# Patient Record
Sex: FEMALE | Race: WHITE | NOT HISPANIC OR LATINO | Employment: FULL TIME | URBAN - METROPOLITAN AREA
[De-identification: names, ages, dates, MRNs, and addresses within clinical notes are randomized per-mention and may not be internally consistent; named-entity substitution may affect disease eponyms.]

---

## 2017-02-21 LAB
HBV SURFACE AG SERPL QL IA: NEGATIVE
HIV 1+2 AB+HIV1 P24 AG SERPL QL IA: NEGATIVE
RUBELLA ANTIBODY IGG QUANTITATIVE: NORMAL IU/ML
T PALLIDUM IGG SER QL: NONREACTIVE

## 2017-02-24 ENCOUNTER — TRANSFERRED RECORDS (OUTPATIENT)
Dept: HEALTH INFORMATION MANAGEMENT | Facility: CLINIC | Age: 32
End: 2017-02-24

## 2017-02-24 ENCOUNTER — TELEPHONE (OUTPATIENT)
Dept: MATERNAL FETAL MEDICINE | Facility: CLINIC | Age: 32
End: 2017-02-24

## 2017-02-24 NOTE — TELEPHONE ENCOUNTER
Order reviewed from [facility] for MFM consult due to exposure to chicken pox.  Data reviewed with Dr. Ram who also contacted Pediatric ID to discuss case.  PCC spoke with Jie regarding details and POC.  Jie states her mother told her she and all her sisters had chicken pox as children, but she had a mild case in comparison to her siblings.  Jie's 21 month old daughter was vaccinated as recommended at 12 months of age also.  Her daughter was exposed to another child at  with chicken pox on 2/8/17.  Rash and sores appeared on daughter on 2/19.  Daughter was seen by a pediatrician on 2/20 who said it was chicken pox.  No blood work was done on the daughter.  Jie saw her OB/GYN on 2/21 who day varicella IgG (0.5) and IgM (pending).  Jie will start valtrex 500 mg BID PO x 7 days as recommended by Dr. Casiano/Dr. Ram.  Jie will be seen for a MFM consult on 3/1/17. Jie encouraged to have no contact with her daughter until all sores are scabbed over. ROD Rich notified of POC and will order Valtrex.    Genny Coley RN

## 2017-02-27 ENCOUNTER — TELEPHONE (OUTPATIENT)
Dept: MATERNAL FETAL MEDICINE | Facility: CLINIC | Age: 32
End: 2017-02-27

## 2017-02-27 NOTE — TELEPHONE ENCOUNTER
Jie called wanting to cancel MFM consult for Wednesday 3/1/17 due to exposure to chicken pox.  Jie has not developed any rash or lesions.  She is taking valtrex as prescribed.  I encouraged Jie to call [facility] to discuss the need or not for a MFM consult.  Jie verbalized understanding.    Genny Coley RN

## 2017-03-01 ENCOUNTER — TRANSFERRED RECORDS (OUTPATIENT)
Dept: HEALTH INFORMATION MANAGEMENT | Facility: CLINIC | Age: 32
End: 2017-03-01

## 2017-03-01 ENCOUNTER — DOCUMENTATION ONLY (OUTPATIENT)
Dept: MATERNAL FETAL MEDICINE | Facility: CLINIC | Age: 32
End: 2017-03-01

## 2017-03-01 ENCOUNTER — TELEPHONE (OUTPATIENT)
Dept: MATERNAL FETAL MEDICINE | Facility: CLINIC | Age: 32
End: 2017-03-01

## 2017-03-01 NOTE — TELEPHONE ENCOUNTER
PCC returned call to Jie.  Jie calling worried that something is wrong with her pregnancy because OB/GYN is sending her to [facility] for screeing and ultrasounds. Jie was scheduled for [facility] consult today but cancelled on 2/27.  Jie states she was at [facility] for 1st trimester screen today and they were unable to get measurement.  Dr. Travis referred Jie to [facility].  I assured Jie we will see her at [facility] for genetic counseling, 1st trimester screening, and [facility] consult to discuss recent exposure to chicken pox.  Jie verbalized understanding.    Genny Coley RN

## 2017-03-01 NOTE — PROGRESS NOTES
PCC sent inbasket message to Sena Campoverde on 2/27 at [facility] regarding patient cancelling MFM consult for 3/1/17.  PCC contacted [facility] and left a message for Sena Campoverde CNM regarding cancellation on 3/1/17.  Sena out of office until 3/2/17.    Genny Coley RN

## 2017-03-02 ENCOUNTER — TELEPHONE (OUTPATIENT)
Dept: MATERNAL FETAL MEDICINE | Facility: CLINIC | Age: 32
End: 2017-03-02

## 2017-03-02 NOTE — TELEPHONE ENCOUNTER
Left message for Jie regarding appointment date and times for Friday March 10, 2017 at [facility] beginning at 9:30am with genetic counseling, 1st trimester ultrasound, [facility] consult due to varicella exposure.  LEEROY notified.  Team sheet faxed to [facility].    Genny Coley RN

## 2017-03-10 ENCOUNTER — OFFICE VISIT (OUTPATIENT)
Dept: MATERNAL FETAL MEDICINE | Facility: CLINIC | Age: 32
End: 2017-03-10
Attending: MIDWIFE
Payer: COMMERCIAL

## 2017-03-10 ENCOUNTER — HOSPITAL ENCOUNTER (OUTPATIENT)
Dept: LAB | Facility: CLINIC | Age: 32
End: 2017-03-10
Attending: OBSTETRICS & GYNECOLOGY
Payer: COMMERCIAL

## 2017-03-10 ENCOUNTER — OFFICE VISIT (OUTPATIENT)
Dept: MATERNAL FETAL MEDICINE | Facility: CLINIC | Age: 32
End: 2017-03-10
Attending: OBSTETRICS & GYNECOLOGY
Payer: COMMERCIAL

## 2017-03-10 ENCOUNTER — HOSPITAL ENCOUNTER (OUTPATIENT)
Dept: ULTRASOUND IMAGING | Facility: CLINIC | Age: 32
Discharge: HOME | End: 2017-03-10
Attending: OBSTETRICS & GYNECOLOGY | Admitting: OBSTETRICS & GYNECOLOGY
Payer: COMMERCIAL

## 2017-03-10 DIAGNOSIS — O99.891: ICD-10-CM

## 2017-03-10 DIAGNOSIS — Z20.820: ICD-10-CM

## 2017-03-10 DIAGNOSIS — Z36.9 FIRST TRIMESTER SCREENING: ICD-10-CM

## 2017-03-10 DIAGNOSIS — O26.90 PREGNANCY RELATED CONDITION, UNSPECIFIED TRIMESTER: ICD-10-CM

## 2017-03-10 DIAGNOSIS — Z36.9 FIRST TRIMESTER SCREENING: Primary | ICD-10-CM

## 2017-03-10 PROCEDURE — 96040 ZZH GENETIC COUNSELING, EACH 30 MINUTES: CPT | Mod: ZF | Performed by: GENETIC COUNSELOR, MS

## 2017-03-10 PROCEDURE — 76813 OB US NUCHAL MEAS 1 GEST: CPT

## 2017-03-10 PROCEDURE — 36415 COLL VENOUS BLD VENIPUNCTURE: CPT | Performed by: OBSTETRICS & GYNECOLOGY

## 2017-03-10 PROCEDURE — 84163 PAPPA SERUM: CPT | Performed by: OBSTETRICS & GYNECOLOGY

## 2017-03-10 PROCEDURE — 84704 HCG FREE BETACHAIN TEST: CPT | Performed by: OBSTETRICS & GYNECOLOGY

## 2017-03-10 NOTE — PROGRESS NOTES
[facility]  Genetic Counseling Consult    Patient: Jie Salas YOB: 1985   Date of Service: 3/10/17      Jie Salas was seen at [facility] [place] for genetic consultation and first trimester screening.  The indication for genetic counseling is Varicella exposure and the patient s request for a first trimester screen.      Impression/Plan:     1.  Jie had an ultrasound and blood draw for first trimester screening.  Results are expected within 3-5 days and will be sent to her OB.  We will call her to discuss the results, and she requested a detailed message with results on her voicemail.    2.  Maternal serum AFP (single marker screen) is recommended after 15 weeks to screen for open neural tube defects.    3.  Jie has had Varicella exposure in this pregnancy and is non-immune.  She has not developed a rash and has had anti-viral treatment; therefore, there is low risk of fetal Varicella infection and birth defects.  Repeat maternal IgG testing is recommended, along with comprehensive US at 18-20 weeks. Please see [facility] ultrasound and consult report for additional information.    Pregnancy History:     /Parity:   Age at Delivery: 31 years old     ELVIA: 2017, by LMP confirmed by ultrasound  Gestational Age: 12w4d    Jie's pregnancy history is as follows:    : 41w  for arrest of descent.  F.    2016: 5w SAB    - In the current pregnancy, Jie was exposed to Varicella from her daughter.  Her daughter had been vaccinated a year ago but had a mild outbreak of chickenpox starting 17 after exposure at .  Jie had been told that she had a mild case of chickenpox as a child, but IgG and IgM testing were negative for immunity.  She took Valtrex (acyclovir) 500 mg twice daily from -3/4.  She has not developed a chickenpox rash. See below for risk assessment.    - Jie had influenza A at  5-6 weeks gestation and was treated with Tamiflu.  She reports she had a 102?F fever for less than a day with that illness.  We discussed that a high fever in pregnancy increases the risk of birth defects and pregnancy complications.  We discussed maternal serum AFP screening for neural tube defects and ultrasound screening for other birth defects.    - No other significant exposures or complications were reported in the current pregnancy.    Family History:     A three-generation pedigree was obtained, and is scanned under the  Media  tab.    The following is per Jie's report:       Jie's sister has hydrocephalus and pseudotumor cerebri, a defect of CSF reabsorption that can lead to tumor-like symptoms.  She has had shunts placed.  We discussed that most cases of PTC are not familial and the recurrence risk in this pregnancy is considered low.    Jie's father was diagnosed with a cardiac arrhythmia in his 40s and has had multiple blood clots.  His mother also had a heart transplant and a pacemaker for arrhythmia.  We discussed that heart disease can be caused by environmental and/or genetic factors.  Jie reports that her father s condition may be related to lifestyle.  We encouraged her to talk with her doctor about this family history.    Jie's  s mother had a learning disability and mental illness, and Jie's  has a maternal half-nephew with autism.  We discussed that these conditions are caused by a combination of environmental and genetic factors.  Recurrence risk for these conditions is low based on this history; we encouraged Jie to discuss the history with her children s pediatrician.    The reported family history is negative for intellectual disability, childhood hearing or vision loss, known genetic conditions, multiple miscarriage, stillbirth, or consanguinity.    Carrier Screening:     The patient reports that she and the father of the baby have  ancestry.    Cystic  fibrosis is an autosomal recessive genetic condition that occurs with increased frequency in individuals of  ancestry and carrier screening for this condition is available.  In addition,  screening in the [facility] includes cystic fibrosis.    Expanded carrier screening for mutations in a large panel of genes associated with autosomal recessive conditions including cystic fibrosis, spinal muscular atrophy, and others, is now available.    The patient had negative CF carrier screening in her previous pregnancy (10/21/2014) and declined the expanded carrier screening options reviewed today.    Risk Assessment for Varicella exposure:     We discussed that if a woman is infected with Varicella during pregnancy, there is a risk of transmitting the infection to the fetus.  If the fetus is infected with Varicella, Congenital Varicella Syndrome (CVS) can lead to skin scars, intellectual disability, developmental delay, shortened arms and legs, muscle weakness, and/or a small head.  Maternal Varicella infection in the first trimester adds an up to 1% risk of birth defects to the general population 3-5% risk of birth defects.    However, Jie has not developed a chickenpox rash at two weeks post-exposure and has been treated with Valtrex.  This lowers the risk that she will develop Varicella infection, so there is a low risk of fetal Varicella infection and a <1% risk of related birth defects.  Repeat maternal IgG testing may be used to look for evidence of infection.  Comprehensive US at 18-20 weeks is also recommended.    Risk Assessment for Chromosome Conditions:     We explained that the risk for fetal chromosome abnormalities increases with maternal age. We discussed specific features of common chromosome abnormalities, including Down syndrome, trisomy 13, trisomy 18, and sex chromosome trisomies.    - At age 30, the mid-pregnancy risk for Down syndrome is 1 in 690.     - At age 30, the  mid-pregnancy risk for any chromosome abnormality is 1 in 345.      Testing Options:     We discussed the following options:    First trimester screening  - First trimester ultrasound with nuchal translucency and nasal bone assessments, maternal plasma hCG, VIDHYA-A, and AFP measurement  - Screens for fetal trisomy 21, trisomy 13, and trisomy 18  - Cannot screen for open neural tube defects; maternal serum AFP after 15 weeks is recommended    Non-invasive Prenatal Testing (NIPT)  - Maternal plasma cell-free DNA testing; first trimester ultrasound with nuchal translucency and nasal bone assessment is recommended, when appropriate  - Screens for fetal trisomy 21, trisomy 13, trisomy 18, and sex chromosome aneuploidy  - Cannot screen for open neural tube defects; maternal serum AFP after 15 weeks is recommended    Chorionic villus sampling (CVS)  - Invasive procedure typically performed in the first trimester by which placental villi are obtained for the purpose of chromosome analysis and/or other prenatal genetic analysis  - Diagnostic results; >99% sensitivity for fetal chromosome abnormalities  - Cannot test for open neural tube defects; maternal serum AFP after 15 weeks is recommended    Genetic Amniocentesis  - Invasive procedure typically performed in the second trimester by which amniotic fluid is obtained for the purpose of chromosome analysis and/or other prenatal genetic analysis  - Diagnostic results; >99% sensitivity for fetal chromosome abnormalities  - AFAFP measurement tests for open neural tube defects    Comprehensive (Level II) ultrasound: Detailed ultrasound performed between 18-20 weeks to screen for major birth defects and markers for aneuploidy.    We reviewed the benefits and limitations of this testing.  Screening tests provide a risk assessment specific to the pregnancy for certain fetal chromosome abnormalities, but cannot definitively diagnose or exclude a fetal chromosome abnormality.   Follow-up genetic counseling and consideration of diagnostic testing is recommended with any abnormal screening result. Diagnostic tests carry inherent risks- including risk of miscarriage- that require careful consideration.  These tests can detect fetal chromosome abnormalities with greater than 99% certainty.  Results can be compromised by maternal cell contamination or mosaicism, and are limited by the resolution of cytogenetic G-banding technology.  There is no screening nor diagnostic test that can detect all forms of birth defects or mental disability.    Jie is a .  She reports that she would like first trimester screening for more information about the pregnancy but would not consider invasive testing.      It was a pleasure to be involved with Jie's care.  Face-to-face time of the meeting was 40 minutes.    Audra Diaz  Genetic counseling intern    This note was documented by Audra Diaz, Genetic Counseling Intern scribing for name of supervising GC.    Marlee Jefferson MS, [facility]  Maternal Fetal Medicine  [facility]  Phone:[phone]  Email: [email or web address]

## 2017-03-10 NOTE — PROGRESS NOTES
"Please see \"Imaging\" tab under \"Chart Review\" for details of today's US and consultation.    Florecita Ram    "

## 2017-03-10 NOTE — MR AVS SNAPSHOT
After Visit Summary   3/10/2017    Jie Salas    MRN: [id number]           Patient Information     Date Of Birth          1985        Visit Information        Provider Department      3/10/2017 9:30 AM Marlee Jefferson GC [facility] Maternal Fetal [facility]        Today's Diagnoses     First trimester screening    -  1    Pregnancy related condition, unspecified trimester           Follow-ups after your visit        Your next 10 appointments already scheduled     Apr 21, 2017  8:00 AM CDT   MFM US COMP with [id number]   [facility] Ultrasound - [facility])    [address]  [facility] [address] [phone]           Wear comfortable clothes and leave your valuables at home.            Apr 21, 2017  8:30 AM CDT   Radiology MD with  KRISTA LONG   [facility])    [address]  [facility] [address] [phone]           Please arrive at the time given for your first appointment.  This visit is used internally to schedule the physician's time during your ultrasound.              Future tests that were ordered for you today     Open Future Orders        Priority Expected Expires Ordered    First Trimester Screen- NTD Labs Routine  6/8/2017 3/10/2017            Who to contact     If you have questions or need follow up information about today's clinic visit or your schedule please contact [facility] FETAL [facility] directly at [phone].  Normal or non-critical lab and imaging results will be communicated to you by MyChart, letter or phone within 4 business days after the clinic has received the results. If you do not hear from us within 7 days, please contact the clinic through [employer] or phone. If you have a critical or abnormal lab result, we will notify you by phone as soon as possible.  Submit refill requests through [employer] or  "call your pharmacy and they will forward the refill request to us. Please allow 3 business days for your refill to be completed.          Additional Information About Your Visit        [employer] Information     Bsmark lets you send messages to your doctor, view your test results, renew your prescriptions, schedule appointments and more. To sign up, go to [email or web address] . Click on \"Log in\" on the left side of the screen, which will take you to the Welcome page. Then click on \"Sign up Now\" on the right side of the page.     You will be asked to enter the access code listed below, as well as some personal information. Please follow the directions to create your username and password.     Your access code is: [email or web address]  Expires: 2017  3:22 PM     Your access code will  in 90 days. If you need help or a new code, please call your [place] clinic or [phone].        Care EveryWhere ID     This is your Care EveryWhere ID. This could be used by other organizations to access your [facility] medical records  [id number]        Your Vitals Were     Last Period                   2016            Blood Pressure from Last 3 Encounters:   16 (!) 135/118   16 115/82   05/12/15 130/74    Weight from Last 3 Encounters:   16 89.4 kg (197 lb)   16 89.4 kg (197 lb)   02/09/15 108.9 kg (240 lb)              We Performed the Following     [facility] Genetic Counseling        Primary Care Provider Office Phone # Fax #    Earnestine Grubbs [phone]       [facility] [address]  [facility] [address]        Thank you!     Thank you for choosing [facility]  for your care. Our goal is always to provide you with excellent care. Hearing back from our patients is one way we can continue to improve our services. Please take a few minutes to complete the written survey that you may receive in the mail after your visit with us. Thank " you!             Your Updated Medication List - Protect others around you: Learn how to safely use, store and throw away your medicines at www.disposemymeds.org.          This list is accurate as of: 3/10/17  3:22 PM.  Always use your most recent med list.                   Brand Name Dispense Instructions for use    prenatal multivitamin  plus iron 27-0.8 MG Tabs per tablet      Take 1 tablet by mouth daily

## 2017-03-15 ENCOUNTER — TELEPHONE (OUTPATIENT)
Dept: MATERNAL FETAL MEDICINE | Facility: CLINIC | Age: 32
End: 2017-03-15

## 2017-03-15 LAB — LAB SCANNED RESULT: NORMAL

## 2017-03-15 NOTE — TELEPHONE ENCOUNTER
Called Jie and discussed her normal first trimester screening results. These results indicated a 1 in >10,000 risk for Down syndrome and a 1 in >10,000 risk for trisomy 18/13.  This screening test gives information about the risk of some chromosome conditions in a pregnancy, but does not definitively diagnose or exclude the presence of these chromosome conditions.  A copy was faxed to her primary OB.  Maternal serum AFP only is recommended in the second trimester to screen for neural tube defects.    Marlee Jefferson MS, [facility]  Maternal Fetal Medicine  [phone]    Cc: Dr. Abdalla  Fax: [phone]

## 2017-04-20 ENCOUNTER — HOSPITAL ENCOUNTER (OUTPATIENT)
Dept: ULTRASOUND IMAGING | Facility: CLINIC | Age: 32
Discharge: HOME | End: 2017-04-20
Attending: OBSTETRICS & GYNECOLOGY | Admitting: OBSTETRICS & GYNECOLOGY
Payer: COMMERCIAL

## 2017-04-20 ENCOUNTER — OFFICE VISIT (OUTPATIENT)
Dept: MATERNAL FETAL MEDICINE | Facility: CLINIC | Age: 32
End: 2017-04-20
Attending: OBSTETRICS & GYNECOLOGY
Payer: COMMERCIAL

## 2017-04-20 DIAGNOSIS — O26.90 PREGNANCY RELATED CONDITION, UNSPECIFIED TRIMESTER: ICD-10-CM

## 2017-04-20 DIAGNOSIS — Z03.73 FETAL ANOMALY SUSPECTED BUT NOT FOUND: Primary | ICD-10-CM

## 2017-04-20 DIAGNOSIS — Z36.3 ANTENATAL SCREENING FOR MALFORMATION USING ULTRASONICS: ICD-10-CM

## 2017-04-20 PROCEDURE — 76811 OB US DETAILED SNGL FETUS: CPT

## 2017-04-20 PROCEDURE — 76817 TRANSVAGINAL US OBSTETRIC: CPT | Performed by: OBSTETRICS & GYNECOLOGY

## 2017-04-20 NOTE — PROGRESS NOTES
Please refer to ultrasound report under 'Imaging' Studies of 'Chart Review' tabs.    Kody Ashraf M.D.

## 2017-04-20 NOTE — MR AVS SNAPSHOT
"                  MRN:[id number]                      After Visit Summary   2017    Jie Salas    MRN: [id number]           Visit Information        Provider Department      2017 2:45 PM Kody Ashraf MD [facility] Maternal Fetal Medicine [facility]        Your next 10 appointments already scheduled     May 16, 2017  2:15 PM CDT   MFM US COMPRE SINGLE F/U with [id number]   [facility]etal Medicine Ultrasound - [facility])    [address]  [facility] [address] [phone]           Wear comfortable clothes and leave your valuables at home.            May 16, 2017  2:45 PM CDT   Radiology MD with  KRISTA LONG   [facility] Maternal Fetal Medicine - [facility])    [address]  [facility] [address] [phone]           Please arrive at the time given for your first appointment.  This visit is used internally to schedule the physician's time during your ultrasound.              Fi.tt Information     Fi.tt lets you send messages to your doctor, view your test results, renew your prescriptions, schedule appointments and more. To sign up, go to [email or web address] . Click on \"Log in\" on the left side of the screen, which will take you to the Welcome page. Then click on \"Sign up Now\" on the right side of the page.     You will be asked to enter the access code listed below, as well as some personal information. Please follow the directions to create your username and password.     Your access code is: [email or web address]  Expires: 2017  4:22 PM     Your access code will  in 90 days. If you need help or a new code, please call your [place] clinic or [phone].        Care EveryWhere ID     This is your Care EveryWhere ID. This could be used by other organizations to access your [facility] medical records  [id number]        "

## 2017-05-16 ENCOUNTER — OFFICE VISIT (OUTPATIENT)
Dept: MATERNAL FETAL MEDICINE | Facility: CLINIC | Age: 32
End: 2017-05-16
Attending: OBSTETRICS & GYNECOLOGY
Payer: COMMERCIAL

## 2017-05-16 ENCOUNTER — HOSPITAL ENCOUNTER (OUTPATIENT)
Dept: ULTRASOUND IMAGING | Facility: CLINIC | Age: 32
Discharge: HOME | End: 2017-05-16
Attending: OBSTETRICS & GYNECOLOGY | Admitting: OBSTETRICS & GYNECOLOGY
Payer: COMMERCIAL

## 2017-05-16 DIAGNOSIS — Z03.73 FETAL ANOMALY SUSPECTED BUT NOT FOUND: ICD-10-CM

## 2017-05-16 DIAGNOSIS — Z03.73 FETAL ANOMALY SUSPECTED BUT NOT FOUND: Primary | ICD-10-CM

## 2017-05-16 PROCEDURE — 76816 OB US FOLLOW-UP PER FETUS: CPT

## 2017-05-16 PROCEDURE — 76817 TRANSVAGINAL US OBSTETRIC: CPT | Performed by: OBSTETRICS & GYNECOLOGY

## 2017-05-16 NOTE — MR AVS SNAPSHOT
"              After Visit Summary   2017    Jie Salas    MRN: [id number]           Patient Information     Date Of Birth          1985        Visit Information        Provider Department      2017 2:45 PM eJd Butler MD [facility] Maternal Fetal Medicine [facility]        Today's Diagnoses     Fetal anomaly suspected but not found    -  1       Follow-ups after your visit        Who to contact     If you have questions or need follow up information about today's clinic visit or your schedule please contact [facility]ET[facility] directly at [phone].  Normal or non-critical lab and imaging results will be communicated to you by [employer]hart, letter or phone within 4 business days after the clinic has received the results. If you do not hear from us within 7 days, please contact the clinic through [employer] or phone. If you have a critical or abnormal lab result, we will notify you by phone as soon as possible.  Submit refill requests through truedash or call your pharmacy and they will forward the refill request to us. Please allow 3 business days for your refill to be completed.          Additional Information About Your Visit        MyChart Information     truedash lets you send messages to your doctor, view your test results, renew your prescriptions, schedule appointments and more. To sign up, go to [email or web address] . Click on \"Log in\" on the left side of the screen, which will take you to the Welcome page. Then click on \"Sign up Now\" on the right side of the page.     You will be asked to enter the access code listed below, as well as some personal information. Please follow the directions to create your username and password.     Your access code is: [email or web address]  Expires: 2017  4:22 PM     Your access code will  in 90 days. If you need help or a new code, please call your [place] clinic or [phone].        Care EveryWhere ID     This is your Care " EveryWhere ID. This could be used by other organizations to access your [facility] medical records  [id number]        Your Vitals Were     Last Period                   12/12/2016            Blood Pressure from Last 3 Encounters:   09/12/16 (!) 135/118   09/11/16 115/82   05/12/15 130/74    Weight from Last 3 Encounters:   09/12/16 89.4 kg (197 lb)   09/11/16 89.4 kg (197 lb)   02/09/15 108.9 kg (240 lb)              Today, you had the following     No orders found for display       Primary Care Provider Office Phone # Fax #    Earnestine Grubbs [phone]       [facility] [address]  [facility] [address]        Thank you!     Thank you for choosing [facility]  for your care. Our goal is always to provide you with excellent care. Hearing back from our patients is one way we can continue to improve our services. Please take a few minutes to complete the written survey that you may receive in the mail after your visit with us. Thank you!             Your Updated Medication List - Protect others around you: Learn how to safely use, store and throw away your medicines at www.disposemymeds.org.          This list is accurate as of: 5/16/17  3:48 PM.  Always use your most recent med list.                   Brand Name Dispense Instructions for use    prenatal multivitamin  plus iron 27-0.8 MG Tabs per tablet      Take 1 tablet by mouth daily

## 2017-05-16 NOTE — PROGRESS NOTES
"Please see \"Imaging\" tab under \"Chart Review\" for details of today's ultrasound.    Jed Butler M.D.  Specialist in Maternal-Fetal Medicine     "

## 2017-08-08 ENCOUNTER — HOSPITAL ENCOUNTER (OUTPATIENT)
Facility: CLINIC | Age: 32
LOS: 1 days | Discharge: HOME | End: 2017-08-08
Attending: OBSTETRICS & GYNECOLOGY | Admitting: OBSTETRICS & GYNECOLOGY
Payer: COMMERCIAL

## 2017-08-08 VITALS — HEIGHT: 65 IN | TEMPERATURE: 98.3 F

## 2017-08-08 PROCEDURE — 99214 OFFICE O/P EST MOD 30 MIN: CPT | Mod: 25

## 2017-08-08 PROCEDURE — 59025 FETAL NON-STRESS TEST: CPT

## 2017-08-08 PROCEDURE — 99213 OFFICE O/P EST LOW 20 MIN: CPT | Mod: 25

## 2017-08-08 RX ORDER — ONDANSETRON 2 MG/ML
4 INJECTION INTRAMUSCULAR; INTRAVENOUS EVERY 6 HOURS PRN
Status: DISCONTINUED | OUTPATIENT
Start: 2017-08-08 | End: 2017-08-08 | Stop reason: HOSPADM

## 2017-08-08 RX ORDER — CALCIUM CARBONATE 500 MG/1
1 TABLET, CHEWABLE ORAL DAILY PRN
COMMUNITY
End: 2017-09-08

## 2017-08-08 NOTE — IP AVS SNAPSHOT
MRN:[id number]                      After Visit Summary   2017    Jie Salas    MRN: [id number]           Thank you!     Thank you for choosing [facility] for your care. Our goal is always to provide you with excellent care. Hearing back from our patients is one way we can continue to improve our services. Please take a few minutes to complete the written survey that you may receive in the mail after you visit. If you would like to speak to someone directly about your visit please contact Patient Relations at [phone]. Thank you!          Patient Information     Date Of Birth          1985        About your hospital stay     You were admitted on:  2017 You last received care in the:  [facility] Labor and Delivery    You were discharged on:  2017       Who to Call     For medical emergencies, please call 911.  For non-urgent questions about your medical care, please call your primary care provider or clinic, [phone]          Attending Provider     Provider Specialty    Suyapa See MD OB/Gyn       Primary Care Provider Office Phone # Fax #    Earnestine Grubbs [phone]      Your next 10 appointments already scheduled     Sep 15, 2017   Procedure with Nile Gregory MD   [facility] Labor [facility] Delivery (--)    [address] [facility] [address] [phone]              Further instructions from your care team       Data: [facility] at 1700.   Reason for maternal/fetal assessment per patient is Non Stress Test  . Patient is a . Prenatal record reviewed.      Obstetric History       T1      L1     SAB0   TAB0   Ectopic0   Multiple0   Live Births1       # Outcome Date GA Lbr Suman/2nd Weight Sex Delivery Anes PTL Lv   2 Current            1 Term 05/09/15 41w2d 15:00 / 03:44 3.34 kg (7 lb 5.8 oz) F CS-LTranv EPI,Gen N SARAH      Apgar1:  1                 "Apgar5: 9         Medical History:   Past Medical History:   Diagnosis Date     Breast disorder     Bilateral breast reduction     Hypertension     prior to weight loss in 2014   . Gestational Age 34w1d. VSS. Cervix: not examined.  Fetal movement present. Patient denies cramping, backache, vaginal discharge, pelvic pressure, UTI symptoms, GI problems, bloody show, vaginal bleeding, edema, headache, visual disturbances, epigastric or URQ pain, abdominal pain, rupture of membranes. Support persons not present.  Action: Verbal consent for EFM. Triage assessment completed. EFM applied for fetal assessment. Uterine assessment with external uterine monitor. Fetal assessment: Presumed adequate fetal oxygenation documented (see flow record).. Patient instructed to report change in fetal movement, vaginal leaking of fluid or bleeding, abdominal pain, or any concerns related to the pregnancy to her nurse/physician.   Response: Dr. See informed of reactive non-stress. Plan per provider is patient to be discharged to home and return to clinic in two weeks as scheduled. Patient verbalized understanding of education and verbalized agreement with plan. Discharged ambulatory at 1820.        Pending Results     No orders found from 8/6/2017 to 8/9/2017.            Admission Information     Date & Time Provider Department Dept. Phone    8/8/2017 Suyapa See MD [facility] Labor and Delivery [phone]      Your Vitals Were     Temperature Height Last Period             98.3  F (36.8  C) (Oral) 1.651 m (5' 5\") 12/12/2016         [employer] Information     [employer] lets you send messages to your doctor, view your test results, renew your prescriptions, schedule appointments and more. To sign up, go to [email or web address] . Click on \"Log in\" on the left side of the screen, which will take you to the Welcome page. Then click on \"Sign up Now\" on the right side of the page.     You will be asked to enter the access code " listed below, as well as some personal information. Please follow the directions to create your username and password.     Your access code is: [email or web address]  Expires: 2017  6:16 PM     Your access code will  in 90 days. If you need help or a new code, please call your [facility] clinic or [phone].        Care EveryWhere ID     This is your Care EveryWhere ID. This could be used by other organizations to access your [facility] medical records  [id number]        Equal Access to Services     [facility] : Hadii ana clement hadasho Soomaali, waaxda luqadaha, qaybta kaalmada adeegyajason, isidra rodriguez . So [facility] [phone].    ATENCIÓN: Si habla español, tiene a carl disposición servicios gratuitos de asistencia lingüística. Llame al [phone].    We comply with applicable federal civil rights laws and [place] laws. We do not discriminate on the basis of race, color, national origin, age, disability sex, sexual orientation or gender identity.               Review of your medicines      UNREVIEWED medicines. Ask your doctor about these medicines        Dose / Directions    calcium carbonate 500 MG chewable tablet   Commonly known as:  TUMS        Dose:  1 chew tab   Take 1 chew tab by mouth daily as needed for heartburn   Refills:  0       prenatal multivitamin plus iron 27-0.8 MG Tabs per tablet        Dose:  1 tablet   Take 1 tablet by mouth daily   Refills:  0                Protect others around you: Learn how to safely use, store and throw away your medicines at www.disposemymeds.org.             Medication List: This is a list of all your medications and when to take them. Check marks below indicate your daily home schedule. Keep this list as a reference.      Medications           Morning Afternoon Evening Bedtime As Needed    calcium carbonate 500 MG chewable tablet   Commonly known as:  TUMS   Take 1 chew tab by mouth daily as needed for heartburn                                 prenatal multivitamin plus iron 27-0.8 MG Tabs per tablet   Take 1 tablet by mouth daily

## 2017-08-08 NOTE — IP AVS SNAPSHOT
[facility] Labor and Delivery    [address]    [facility] [address]    Phone:  [phone]    Fax:  [phone]                                       After Visit Summary   8/8/2017    Jie Salas    MRN: [id number]           After Visit Summary Signature Page     I have received my discharge instructions, and my questions have been answered. I have discussed any challenges I see with this plan with the nurse or doctor.    ..........................................................................................................................................  Patient/Patient Representative Signature      ..........................................................................................................................................  Patient Representative Print Name and Relationship to Patient    ..................................................               ................................................  Date                                            Time    ..........................................................................................................................................  Reviewed by Signature/Title    ...................................................              ..............................................  Date                                                            Time

## 2017-08-08 NOTE — DISCHARGE INSTRUCTIONS
Data: Patient presented to the [facility] at 1700.   Reason for maternal/fetal assessment per patient is Non Stress Test  . Patient is a . Prenatal record reviewed.      Obstetric History       T1      L1     SAB0   TAB0   Ectopic0   Multiple0   Live Births1       # Outcome Date GA Lbr Suman/2nd Weight Sex Delivery Anes PTL Lv   2 Current            1 Term 05/09/15 41w2d 15:00 / 03:44 3.34 kg (7 lb 5.8 oz) F CS-LTranv EPI,Gen N SARAH      Apgar1:  1                Apgar5: 9         Medical History:   Past Medical History:   Diagnosis Date     Breast disorder     Bilateral breast reduction     Hypertension     prior to weight loss in    . Gestational Age 34w1d. VSS. Cervix: not examined.  Fetal movement present. Patient denies cramping, backache, vaginal discharge, pelvic pressure, UTI symptoms, GI problems, bloody show, vaginal bleeding, edema, headache, visual disturbances, epigastric or URQ pain, abdominal pain, rupture of membranes. Support persons not present.  Action: Verbal consent for EFM. Triage assessment completed. EFM applied for fetal assessment. Uterine assessment with external uterine monitor. Fetal assessment: Presumed adequate fetal oxygenation documented (see flow record).. Patient instructed to report change in fetal movement, vaginal leaking of fluid or bleeding, abdominal pain, or any concerns related to the pregnancy to her nurse/physician.   Response: Dr. See informed of reactive non-stress. Plan per provider is patient to be discharged to home and return to clinic in two weeks as scheduled. Patient verbalized understanding of education and verbalized agreement with plan. Discharged ambulatory at 1820.

## 2017-08-08 NOTE — PROVIDER NOTIFICATION
08/08/17 1814   Provider Notification   Provider Name/Title Dr. See   Method of Notification Phone   Request Evaluate - Remote   Notification Reason Status Update

## 2017-08-09 NOTE — PLAN OF CARE
sent to L&D unit per Dr. See for NST due to irregular heartbeat heard at office visit this afternoon. Patient denies LOF or vaginal bleeding. Nursing data base completed. Patient aware of fetal movement. External fetal monitor applied.

## 2017-08-13 ENCOUNTER — HOSPITAL ENCOUNTER (OUTPATIENT)
Facility: CLINIC | Age: 32
Discharge: HOME | End: 2017-08-13
Attending: OBSTETRICS & GYNECOLOGY | Admitting: OBSTETRICS & GYNECOLOGY
Payer: COMMERCIAL

## 2017-08-13 ENCOUNTER — NURSE TRIAGE (OUTPATIENT)
Dept: NURSING | Facility: CLINIC | Age: 32
End: 2017-08-13

## 2017-08-13 VITALS
DIASTOLIC BLOOD PRESSURE: 69 MMHG | HEART RATE: 105 BPM | TEMPERATURE: 98.7 F | HEIGHT: 65 IN | BODY MASS INDEX: 43.65 KG/M2 | SYSTOLIC BLOOD PRESSURE: 124 MMHG | WEIGHT: 262 LBS | RESPIRATION RATE: 16 BRPM

## 2017-08-13 LAB
A1 MICROGLOB PLACENTAL VAG QL: NEGATIVE
ALBUMIN UR-MCNC: NEGATIVE MG/DL
APPEARANCE UR: ABNORMAL
BACTERIA #/AREA URNS HPF: ABNORMAL /HPF
BILIRUB UR QL STRIP: NEGATIVE
COLOR UR AUTO: YELLOW
GLUCOSE UR STRIP-MCNC: NEGATIVE MG/DL
HGB UR QL STRIP: NEGATIVE
KETONES UR STRIP-MCNC: NEGATIVE MG/DL
LEUKOCYTE ESTERASE UR QL STRIP: ABNORMAL
MUCOUS THREADS #/AREA URNS LPF: PRESENT /LPF
NITRATE UR QL: NEGATIVE
PH UR STRIP: 7 PH (ref 5–7)
RBC #/AREA URNS AUTO: <1 /HPF (ref 0–2)
SP GR UR STRIP: 1.01 (ref 1–1.03)
SQUAMOUS #/AREA URNS AUTO: 1 /HPF (ref 0–1)
URN SPEC COLLECT METH UR: ABNORMAL
UROBILINOGEN UR STRIP-MCNC: 0 MG/DL (ref 0–2)
WBC #/AREA URNS AUTO: 2 /HPF (ref 0–2)

## 2017-08-13 PROCEDURE — 84112 EVAL AMNIOTIC FLUID PROTEIN: CPT | Performed by: OBSTETRICS & GYNECOLOGY

## 2017-08-13 PROCEDURE — 81001 URINALYSIS AUTO W/SCOPE: CPT | Performed by: OBSTETRICS & GYNECOLOGY

## 2017-08-13 PROCEDURE — 99214 OFFICE O/P EST MOD 30 MIN: CPT

## 2017-08-13 NOTE — PLAN OF CARE
Data: Patient presented [facility] the [facility] at 1000  Reason for maternal/fetal assessment per patient is ? SROM leaked some fluid yesterday after sneezing, and then ever once in a while leaking when stands up, fluid is clear, no bleeding,no contractions uterus palpates soft, baby is active. Patient is a . Prenatal record reviewed.      Obstetric History       T1      L1     SAB0   TAB0   Ectopic0   Multiple0   Live Births1       # Outcome Date GA Lbr Suman/2nd Weight Sex Delivery Anes PTL Lv   2 Current            1 Term 05/09/15 41w2d 15:00 / 03:44 3.34 kg (7 lb 5.8 oz) F CS-LTranv EPI,Gen N SARAH      Apgar1:  1                Apgar5: 9         Medical History:   Past Medical History:   Diagnosis Date     Breast disorder     Bilateral breast reduction     Hypertension     prior to weight loss in    . Gestational Age 34w6d. VSS. Cervix: not assessed  Fetal movement present. Patient denies cramping, backache, pelvic pressure, UTI symptoms, GI problems, bloody show, vaginal bleeding, edema, headache, visual disturbances, epigastric or URQ pain,or abdominal pain.  Action:  EFM/EUM on. Triage assessment completed.  moderate variability, + accelerations, no decelerations, no contractions, Amnisure obtained and sent to lab along with a UA , both results came back negative  Response: Dr. Chávez given phone update, discharge home, all printed out discharge instructions verbally reviewed Patient verbalized understanding of education and verbalized agreement with plan. Discharged ambulatory at 1105

## 2017-08-13 NOTE — IP AVS SNAPSHOT
[facility] Labor and Delivery    [address]    [facility] [address]    Phone:  [phone]    Fax:  [phone]                                       After Visit Summary   8/13/2017    Jie Salas    MRN: [id number]           After Visit Summary Signature Page     I have received my discharge instructions, and my questions have been answered. I have discussed any challenges I see with this plan with the nurse or doctor.    ..........................................................................................................................................  Patient/Patient Representative Signature      ..........................................................................................................................................  Patient Representative Print Name and Relationship to Patient    ..................................................               ................................................  Date                                            Time    ..........................................................................................................................................  Reviewed by Signature/Title    ...................................................              ..............................................  Date                                                            Time

## 2017-08-13 NOTE — DISCHARGE INSTRUCTIONS
Discharge Instruction for Undelivered Patients      You were seen for:  Possible rupture of membranes (water broke),   We consulted Dr EB Chávez  You had (Test or Medicine): Amnisure which was negative and a urinalysis which was negative    Diet:   Regular with increased liquids     Activity:  Usual routine    Call your provider if you notice:  Swelling in your face or increased swelling in your hands or legs.  Headaches that are not relieved by Tylenol (acetaminophen).  Changes in your vision (blurring: seeing spots or stars.)  Nausea (sick to your stomach) and vomiting (throwing up).   Heartburn that doesn't go away.  Signs of bladder infection: pain when you urinate (use the toilet), need to go more often and more urgently.  The bag of mane (rupture of membranes) breaks, or you notice leaking in your underwear.  Bright red blood in your underwear.  Abdominal (lower belly) or stomach pain.  Second (plus) baby: Contractions (tightening) less than 10 minutes apart and getting stronger.    Increase or change in vaginal discharge (note the color and amount    Follow-up:  As scheduled

## 2017-08-13 NOTE — IP AVS SNAPSHOT
MRN:[id number]                      After Visit Summary   8/13/2017    Jie Salas    MRN: [id number]           Thank you!     Thank you for choosing [facility] for your care. Our goal is always to provide you with excellent care. Hearing back from our patients is one way we can continue to improve our services. Please take a few minutes to complete the written survey that you may receive in the mail after you visit. If you would like to speak to someone directly about your visit please contact Patient Relations at [phone]. Thank you!          Patient Information     Date Of Birth          1985        About your hospital stay     You were admitted on:  August 13, 2017 You last received care in the:  [facility] Labor and Delivery    You were discharged on:  August 13, 2017       Who to Call     For medical emergencies, please call 911.  For non-urgent questions about your medical care, please call your primary care provider or clinic, [phone]          Attending Provider     Provider Specialty    Yakelin Rivera MD OB/Gyn    Kyler, Angela Matthew MD OB/Gyn       Primary Care Provider Office Phone # Fax #    Earnestine Tiff Grubbs [phone]      Your next 10 appointments already scheduled     Sep 15, 2017   Procedure with Nile Gregory MD   [facility] Labor and Delivery (--)    [address] [facility] [id number] [phone]              Further instructions from your care team       Discharge Instruction for Undelivered Patients      You were seen for:  Possible rupture of membranes (water broke),   We consulted Dr EB Chávez  You had (Test or Medicine): Amnisure which was negative and a urinalysis which was negative    Diet:   Regular with increased liquids     Activity:  Usual routine    Call your provider if you notice:  Swelling in your face or increased swelling in your hands or legs.  Headaches that are not  "relieved by Tylenol (acetaminophen).  Changes in your vision (blurring: seeing spots or stars.)  Nausea (sick to your stomach) and vomiting (throwing up).   Heartburn that doesn't go away.  Signs of bladder infection: pain when you urinate (use the toilet), need to go more often and more urgently.  The bag of mane (rupture of membranes) breaks, or you notice leaking in your underwear.  Bright red blood in your underwear.  Abdominal (lower belly) or stomach pain.  Second (plus) baby: Contractions (tightening) less than 10 minutes apart and getting stronger.    Increase or change in vaginal discharge (note the color and amount    Follow-up:  As scheduled          Pending Results     No orders found from 2017 to 2017.            Admission Information     Date & Time Provider Department Dept. Phone    2017 Angela Chávez MD [facility] Labor and Delivery [phone]      Your Vitals Were     Blood Pressure Pulse Temperature Respirations Height Weight    124/69 105 98.7  F (37.1  C) (Oral) 16 1.651 m (5' 5\") 118.8 kg (262 lb)    Last Period BMI (Body Mass Index)                2016 43.6 kg/m2          [employer] Information     "[employer] lets you send messages to your doctor, view your test results, renew your prescriptions, schedule appointments and more. To sign up, go to [email or web address]" . Click on \"Log in\" on the left side of the screen, which will take you to the Welcome page. Then click on \"Sign up Now\" on the right side of the page.     You will be asked to enter the access code listed below, as well as some personal information. Please follow the directions to create your username and password.     Your access code is: [email or web address]  Expires: 2017  6:16 PM     Your access code will  in 90 days. If you need help or a new code, please call your [facility] clinic or [phone].        Care EveryWhere ID     This is your Care EveryWhere ID. This could be used by other " organizations to access your [facility] medical records  [id number]        Equal Access to Services     ISIS PARSONS : Chriss Hoff, wababs cardona, qajd arriaga, isidra stockton. So [facility] [phone].    ATENCIÓN: Si habla español, tiene a carl disposición servicios gratuitos de asistencia lingüística. Llame al [phone].    We comply with applicable federal civil rights laws and [place] laws. We do not discriminate on the basis of race, color, national origin, age, disability sex, sexual orientation or gender identity.               Review of your medicines      UNREVIEWED medicines. Ask your doctor about these medicines        Dose / Directions    calcium carbonate 500 MG chewable tablet   Commonly known as:  TUMS        Dose:  1 chew tab   Take 1 chew tab by mouth daily as needed for heartburn   Refills:  0       prenatal multivitamin plus iron 27-0.8 MG Tabs per tablet        Dose:  1 tablet   Take 1 tablet by mouth daily   Refills:  0       ZANTAC PO        Dose:  150 mg   Take 150 mg by mouth 2 times daily   Refills:  0                Protect others around you: Learn how to safely use, store and throw away your medicines at www.disposemymeds.org.             Medication List: This is a list of all your medications and when to take them. Check marks below indicate your daily home schedule. Keep this list as a reference.      Medications           Morning Afternoon Evening Bedtime As Needed    calcium carbonate 500 MG chewable tablet   Commonly known as:  TUMS   Take 1 chew tab by mouth daily as needed for heartburn                                prenatal multivitamin plus iron 27-0.8 MG Tabs per tablet   Take 1 tablet by mouth daily                                ZANTAC PO   Take 150 mg by mouth 2 times daily

## 2017-08-13 NOTE — TELEPHONE ENCOUNTER
"  Reason for Disposition    Leakage of fluid from vagina     Caller states that she sneezed a couple of times yesterday and each sneeze she has a gush of clear fluid from her vagina and some ongoing trickling of fluid, not sure if her membranes ruptured. She is 36 weeks and prior birth she had to be induced and , no contractions, no low back pain, no fever, she is treating with [facility], and gave her guidelines to go to L&S and she insisted on paging connected her with [employer]  and they were able to page for her.    Additional Information    Negative: [1] SEVERE abdominal pain (e.g., excruciating) AND [2] constant AND [3] present > 1 hour    Negative: Severe bleeding (e.g., continuous red blood from vagina, or large blood clots)    Negative: Umbilical cord hanging out of the vagina (shiny, white, curled appearance, \"like telephone cord\")    Negative: Uncontrollable urge to push (i.e., feels like baby is coming out now)    Negative: Can see baby    Negative: Sounds like a life-threatening emergency to the triager    Negative: < 20 weeks pregnant    Negative: Vaginal bleeding    Protocols used: PREGNANCY - RUPTURE OF MEMBRANES-ADULT-    Naomi Chamorro RN, BSN  [facility]    "

## 2017-08-15 RX ORDER — CITRIC ACID/SODIUM CITRATE 334-500MG
30 SOLUTION, ORAL ORAL
Status: CANCELLED | OUTPATIENT
Start: 2017-08-15

## 2017-08-15 RX ORDER — SODIUM CHLORIDE, SODIUM LACTATE, POTASSIUM CHLORIDE, CALCIUM CHLORIDE 600; 310; 30; 20 MG/100ML; MG/100ML; MG/100ML; MG/100ML
INJECTION, SOLUTION INTRAVENOUS CONTINUOUS
Status: CANCELLED | OUTPATIENT
Start: 2017-08-15

## 2017-08-15 RX ORDER — CLINDAMYCIN PHOSPHATE 900 MG/50ML
900 INJECTION, SOLUTION INTRAVENOUS
Status: CANCELLED | OUTPATIENT
Start: 2017-08-15

## 2017-08-23 LAB — GROUP B STREP PCR: NEGATIVE

## 2017-09-14 ENCOUNTER — HOSPITAL ENCOUNTER (OUTPATIENT)
Dept: LAB | Facility: CLINIC | Age: 32
Discharge: HOME | End: 2017-09-14
Attending: OBSTETRICS & GYNECOLOGY | Admitting: OBSTETRICS & GYNECOLOGY
Payer: COMMERCIAL

## 2017-09-14 DIAGNOSIS — Z01.812 PRE-OPERATIVE LABORATORY EXAMINATION: ICD-10-CM

## 2017-09-14 LAB
ABO + RH BLD: NORMAL
ABO + RH BLD: NORMAL
BLD GP AB SCN SERPL QL: NORMAL
BLOOD BANK CMNT PATIENT-IMP: NORMAL
SPECIMEN EXP DATE BLD: NORMAL
T PALLIDUM IGG+IGM SER QL: NEGATIVE

## 2017-09-14 PROCEDURE — 86780 TREPONEMA PALLIDUM: CPT | Performed by: OBSTETRICS & GYNECOLOGY

## 2017-09-14 PROCEDURE — 86901 BLOOD TYPING SEROLOGIC RH(D): CPT | Performed by: OBSTETRICS & GYNECOLOGY

## 2017-09-14 PROCEDURE — 36415 COLL VENOUS BLD VENIPUNCTURE: CPT | Performed by: OBSTETRICS & GYNECOLOGY

## 2017-09-14 PROCEDURE — 86900 BLOOD TYPING SEROLOGIC ABO: CPT | Performed by: OBSTETRICS & GYNECOLOGY

## 2017-09-14 PROCEDURE — 86850 RBC ANTIBODY SCREEN: CPT | Performed by: OBSTETRICS & GYNECOLOGY

## 2017-09-14 NOTE — PHARMACY-ADMISSION MEDICATION HISTORY
Admission medication history interview status for this patient is complete. See [facility] admission navigator for allergy information, prior to admission medications and immunization status.     PTA meds completed by pre-admitting nurse Terri Cavazos and reviewed by pharmacy       Prior to Admission medications    Medication Sig Last Dose Taking? Auth Provider   RaNITidine HCl (ZANTAC PO) Take 150 mg by mouth 2 times daily as needed   Yes Reported, Patient   Prenatal Vit-Fe Fumarate-FA (PRENATAL MULTIVITAMIN  PLUS IRON) 27-0.8 MG TABS Take 1 tablet by mouth daily  Yes Reported, Patient

## 2017-09-15 ENCOUNTER — ANESTHESIA (OUTPATIENT)
Dept: OBGYN | Facility: CLINIC | Age: 32
End: 2017-09-15
Payer: COMMERCIAL

## 2017-09-15 ENCOUNTER — HOSPITAL ENCOUNTER (INPATIENT)
Facility: CLINIC | Age: 32
LOS: 3 days | Discharge: HOME | End: 2017-09-18
Attending: OBSTETRICS & GYNECOLOGY | Admitting: OBSTETRICS & GYNECOLOGY
Payer: COMMERCIAL

## 2017-09-15 ENCOUNTER — ANESTHESIA EVENT (OUTPATIENT)
Dept: OBGYN | Facility: CLINIC | Age: 32
End: 2017-09-15
Payer: COMMERCIAL

## 2017-09-15 LAB — HGB BLD-MCNC: 13 G/DL (ref 11.7–15.7)

## 2017-09-15 PROCEDURE — 36000058 ZZH SURGERY LEVEL 3 EA 15 ADDTL MIN: Performed by: OBSTETRICS & GYNECOLOGY

## 2017-09-15 PROCEDURE — 36000056 ZZH SURGERY LEVEL 3 1ST 30 MIN: Performed by: OBSTETRICS & GYNECOLOGY

## 2017-09-15 PROCEDURE — 25000128 H RX IP 250 OP 636: Performed by: OBSTETRICS & GYNECOLOGY

## 2017-09-15 PROCEDURE — 86901 BLOOD TYPING SEROLOGIC RH(D): CPT | Performed by: OBSTETRICS & GYNECOLOGY

## 2017-09-15 PROCEDURE — 85018 HEMOGLOBIN: CPT | Performed by: OBSTETRICS & GYNECOLOGY

## 2017-09-15 PROCEDURE — 86900 BLOOD TYPING SEROLOGIC ABO: CPT | Performed by: OBSTETRICS & GYNECOLOGY

## 2017-09-15 PROCEDURE — 37000009 ZZH ANESTHESIA TECHNICAL FEE, EACH ADDTL 15 MIN: Performed by: OBSTETRICS & GYNECOLOGY

## 2017-09-15 PROCEDURE — 37000008 ZZH ANESTHESIA TECHNICAL FEE, 1ST 30 MIN: Performed by: OBSTETRICS & GYNECOLOGY

## 2017-09-15 PROCEDURE — 25000125 ZZHC RX 250: Performed by: NURSE ANESTHETIST, CERTIFIED REGISTERED

## 2017-09-15 PROCEDURE — 27210794 ZZH OR GENERAL SUPPLY STERILE: Performed by: OBSTETRICS & GYNECOLOGY

## 2017-09-15 PROCEDURE — 71000012 ZZH RECOVERY PHASE 1 LEVEL 1 FIRST HR: Performed by: OBSTETRICS & GYNECOLOGY

## 2017-09-15 PROCEDURE — 25000128 H RX IP 250 OP 636: Performed by: NURSE ANESTHETIST, CERTIFIED REGISTERED

## 2017-09-15 PROCEDURE — 12000029 ZZH R&B OB INTERMEDIATE

## 2017-09-15 PROCEDURE — 71000013 ZZH RECOVERY PHASE 1 LEVEL 1 EA ADDTL HR: Performed by: OBSTETRICS & GYNECOLOGY

## 2017-09-15 PROCEDURE — 25000128 H RX IP 250 OP 636: Performed by: ANESTHESIOLOGY

## 2017-09-15 PROCEDURE — 25000125 ZZHC RX 250: Performed by: OBSTETRICS & GYNECOLOGY

## 2017-09-15 PROCEDURE — 25000132 ZZH RX MED GY IP 250 OP 250 PS 637: Performed by: OBSTETRICS & GYNECOLOGY

## 2017-09-15 PROCEDURE — 25000125 ZZHC RX 250: Performed by: ANESTHESIOLOGY

## 2017-09-15 RX ORDER — FENTANYL CITRATE 50 UG/ML
25-50 INJECTION, SOLUTION INTRAMUSCULAR; INTRAVENOUS EVERY 5 MIN PRN
Status: DISCONTINUED | OUTPATIENT
Start: 2017-09-15 | End: 2017-09-15 | Stop reason: HOSPADM

## 2017-09-15 RX ORDER — NALOXONE HYDROCHLORIDE 0.4 MG/ML
.1-.4 INJECTION, SOLUTION INTRAMUSCULAR; INTRAVENOUS; SUBCUTANEOUS
Status: DISCONTINUED | OUTPATIENT
Start: 2017-09-15 | End: 2017-09-18 | Stop reason: HOSPADM

## 2017-09-15 RX ORDER — DIPHENHYDRAMINE HYDROCHLORIDE 50 MG/ML
25 INJECTION INTRAMUSCULAR; INTRAVENOUS EVERY 6 HOURS PRN
Status: DISCONTINUED | OUTPATIENT
Start: 2017-09-15 | End: 2017-09-18 | Stop reason: HOSPADM

## 2017-09-15 RX ORDER — SODIUM CHLORIDE, SODIUM LACTATE, POTASSIUM CHLORIDE, CALCIUM CHLORIDE 600; 310; 30; 20 MG/100ML; MG/100ML; MG/100ML; MG/100ML
INJECTION, SOLUTION INTRAVENOUS CONTINUOUS
Status: DISCONTINUED | OUTPATIENT
Start: 2017-09-15 | End: 2017-09-15

## 2017-09-15 RX ORDER — MEPERIDINE HYDROCHLORIDE 50 MG/ML
12.5 INJECTION INTRAMUSCULAR; INTRAVENOUS; SUBCUTANEOUS
Status: DISCONTINUED | OUTPATIENT
Start: 2017-09-15 | End: 2017-09-16

## 2017-09-15 RX ORDER — ACETAMINOPHEN 325 MG/1
975 TABLET ORAL EVERY 8 HOURS
Status: COMPLETED | OUTPATIENT
Start: 2017-09-15 | End: 2017-09-18

## 2017-09-15 RX ORDER — DIPHENHYDRAMINE HCL 25 MG
25 CAPSULE ORAL EVERY 6 HOURS PRN
Status: DISCONTINUED | OUTPATIENT
Start: 2017-09-15 | End: 2017-09-18 | Stop reason: HOSPADM

## 2017-09-15 RX ORDER — FENTANYL CITRATE 50 UG/ML
25-50 INJECTION, SOLUTION INTRAMUSCULAR; INTRAVENOUS
Status: CANCELLED | OUTPATIENT
Start: 2017-09-15

## 2017-09-15 RX ORDER — HYDRALAZINE HYDROCHLORIDE 20 MG/ML
2.5-5 INJECTION INTRAMUSCULAR; INTRAVENOUS EVERY 10 MIN PRN
Status: DISCONTINUED | OUTPATIENT
Start: 2017-09-15 | End: 2017-09-15 | Stop reason: HOSPADM

## 2017-09-15 RX ORDER — CITRIC ACID/SODIUM CITRATE 334-500MG
30 SOLUTION, ORAL ORAL
Status: COMPLETED | OUTPATIENT
Start: 2017-09-15 | End: 2017-09-15

## 2017-09-15 RX ORDER — ALBUTEROL SULFATE 0.83 MG/ML
2.5 SOLUTION RESPIRATORY (INHALATION) EVERY 4 HOURS PRN
Status: DISCONTINUED | OUTPATIENT
Start: 2017-09-15 | End: 2017-09-15 | Stop reason: HOSPADM

## 2017-09-15 RX ORDER — MORPHINE SULFATE 1 MG/ML
INJECTION, SOLUTION EPIDURAL; INTRATHECAL; INTRAVENOUS PRN
Status: DISCONTINUED | OUTPATIENT
Start: 2017-09-15 | End: 2017-09-15

## 2017-09-15 RX ORDER — OXYCODONE HYDROCHLORIDE 5 MG/1
5-10 TABLET ORAL
Status: DISCONTINUED | OUTPATIENT
Start: 2017-09-15 | End: 2017-09-18 | Stop reason: HOSPADM

## 2017-09-15 RX ORDER — ONDANSETRON 4 MG/1
4 TABLET, ORALLY DISINTEGRATING ORAL EVERY 30 MIN PRN
Status: DISCONTINUED | OUTPATIENT
Start: 2017-09-15 | End: 2017-09-16

## 2017-09-15 RX ORDER — ONDANSETRON 2 MG/ML
4 INJECTION INTRAMUSCULAR; INTRAVENOUS EVERY 30 MIN PRN
Status: DISCONTINUED | OUTPATIENT
Start: 2017-09-15 | End: 2017-09-16

## 2017-09-15 RX ORDER — CEFAZOLIN SODIUM 1 G/50ML
3 SOLUTION INTRAVENOUS
Status: COMPLETED | OUTPATIENT
Start: 2017-09-15 | End: 2017-09-15

## 2017-09-15 RX ORDER — LIDOCAINE 40 MG/G
CREAM TOPICAL
Status: DISCONTINUED | OUTPATIENT
Start: 2017-09-15 | End: 2017-09-18 | Stop reason: HOSPADM

## 2017-09-15 RX ORDER — SIMETHICONE 80 MG
80 TABLET,CHEWABLE ORAL 4 TIMES DAILY PRN
Status: DISCONTINUED | OUTPATIENT
Start: 2017-09-15 | End: 2017-09-18 | Stop reason: HOSPADM

## 2017-09-15 RX ORDER — NALBUPHINE HYDROCHLORIDE 10 MG/ML
2.5-5 INJECTION, SOLUTION INTRAMUSCULAR; INTRAVENOUS; SUBCUTANEOUS EVERY 6 HOURS PRN
Status: DISCONTINUED | OUTPATIENT
Start: 2017-09-15 | End: 2017-09-15

## 2017-09-15 RX ORDER — CEFAZOLIN SODIUM 1 G/3ML
1 INJECTION, POWDER, FOR SOLUTION INTRAMUSCULAR; INTRAVENOUS SEE ADMIN INSTRUCTIONS
Status: DISCONTINUED | OUTPATIENT
Start: 2017-09-15 | End: 2017-09-15

## 2017-09-15 RX ORDER — BISACODYL 10 MG
10 SUPPOSITORY, RECTAL RECTAL DAILY PRN
Status: DISCONTINUED | OUTPATIENT
Start: 2017-09-17 | End: 2017-09-18 | Stop reason: HOSPADM

## 2017-09-15 RX ORDER — EPHEDRINE SULFATE 50 MG/ML
INJECTION, SOLUTION INTRAVENOUS PRN
Status: DISCONTINUED | OUTPATIENT
Start: 2017-09-15 | End: 2017-09-15

## 2017-09-15 RX ORDER — OXYTOCIN/0.9 % SODIUM CHLORIDE 30/500 ML
PLASTIC BAG, INJECTION (ML) INTRAVENOUS CONTINUOUS PRN
Status: DISCONTINUED | OUTPATIENT
Start: 2017-09-15 | End: 2017-09-15

## 2017-09-15 RX ORDER — OXYTOCIN 10 [USP'U]/ML
10 INJECTION, SOLUTION INTRAMUSCULAR; INTRAVENOUS
Status: DISCONTINUED | OUTPATIENT
Start: 2017-09-15 | End: 2017-09-18 | Stop reason: HOSPADM

## 2017-09-15 RX ORDER — DEXTROSE, SODIUM CHLORIDE, SODIUM LACTATE, POTASSIUM CHLORIDE, AND CALCIUM CHLORIDE 5; .6; .31; .03; .02 G/100ML; G/100ML; G/100ML; G/100ML; G/100ML
INJECTION, SOLUTION INTRAVENOUS CONTINUOUS
Status: DISCONTINUED | OUTPATIENT
Start: 2017-09-15 | End: 2017-09-18 | Stop reason: HOSPADM

## 2017-09-15 RX ORDER — NALOXONE HYDROCHLORIDE 0.4 MG/ML
.1-.4 INJECTION, SOLUTION INTRAMUSCULAR; INTRAVENOUS; SUBCUTANEOUS
Status: ACTIVE | OUTPATIENT
Start: 2017-09-15 | End: 2017-09-16

## 2017-09-15 RX ORDER — LANOLIN 100 %
OINTMENT (GRAM) TOPICAL
Status: DISCONTINUED | OUTPATIENT
Start: 2017-09-15 | End: 2017-09-18 | Stop reason: HOSPADM

## 2017-09-15 RX ORDER — OXYTOCIN/0.9 % SODIUM CHLORIDE 30/500 ML
100 PLASTIC BAG, INJECTION (ML) INTRAVENOUS CONTINUOUS
Status: DISCONTINUED | OUTPATIENT
Start: 2017-09-15 | End: 2017-09-18 | Stop reason: HOSPADM

## 2017-09-15 RX ORDER — ONDANSETRON 2 MG/ML
4 INJECTION INTRAMUSCULAR; INTRAVENOUS EVERY 4 HOURS PRN
Status: DISCONTINUED | OUTPATIENT
Start: 2017-09-15 | End: 2017-09-15

## 2017-09-15 RX ORDER — MISOPROSTOL 200 UG/1
400 TABLET ORAL
Status: DISCONTINUED | OUTPATIENT
Start: 2017-09-15 | End: 2017-09-18 | Stop reason: HOSPADM

## 2017-09-15 RX ORDER — SODIUM CHLORIDE, SODIUM LACTATE, POTASSIUM CHLORIDE, CALCIUM CHLORIDE 600; 310; 30; 20 MG/100ML; MG/100ML; MG/100ML; MG/100ML
INJECTION, SOLUTION INTRAVENOUS CONTINUOUS
Status: DISCONTINUED | OUTPATIENT
Start: 2017-09-15 | End: 2017-09-16

## 2017-09-15 RX ORDER — NALOXONE HYDROCHLORIDE 0.4 MG/ML
.1-.4 INJECTION, SOLUTION INTRAMUSCULAR; INTRAVENOUS; SUBCUTANEOUS
Status: DISCONTINUED | OUTPATIENT
Start: 2017-09-15 | End: 2017-09-15

## 2017-09-15 RX ORDER — BUPIVACAINE HYDROCHLORIDE 7.5 MG/ML
INJECTION, SOLUTION INTRASPINAL PRN
Status: DISCONTINUED | OUTPATIENT
Start: 2017-09-15 | End: 2017-09-15

## 2017-09-15 RX ORDER — OXYTOCIN/0.9 % SODIUM CHLORIDE 30/500 ML
340 PLASTIC BAG, INJECTION (ML) INTRAVENOUS CONTINUOUS PRN
Status: DISCONTINUED | OUTPATIENT
Start: 2017-09-15 | End: 2017-09-18 | Stop reason: HOSPADM

## 2017-09-15 RX ORDER — METOPROLOL TARTRATE 1 MG/ML
1-2 INJECTION, SOLUTION INTRAVENOUS EVERY 5 MIN PRN
Status: DISCONTINUED | OUTPATIENT
Start: 2017-09-15 | End: 2017-09-15 | Stop reason: HOSPADM

## 2017-09-15 RX ORDER — KETOROLAC TROMETHAMINE 30 MG/ML
30 INJECTION, SOLUTION INTRAMUSCULAR; INTRAVENOUS EVERY 6 HOURS PRN
Status: DISCONTINUED | OUTPATIENT
Start: 2017-09-15 | End: 2017-09-16

## 2017-09-15 RX ORDER — HYDROCORTISONE 2.5 %
CREAM (GRAM) TOPICAL 3 TIMES DAILY PRN
Status: DISCONTINUED | OUTPATIENT
Start: 2017-09-15 | End: 2017-09-18 | Stop reason: HOSPADM

## 2017-09-15 RX ORDER — HYDROMORPHONE HYDROCHLORIDE 1 MG/ML
.3-.5 INJECTION, SOLUTION INTRAMUSCULAR; INTRAVENOUS; SUBCUTANEOUS EVERY 10 MIN PRN
Status: DISCONTINUED | OUTPATIENT
Start: 2017-09-15 | End: 2017-09-16

## 2017-09-15 RX ORDER — KETOROLAC TROMETHAMINE 30 MG/ML
30 INJECTION, SOLUTION INTRAMUSCULAR; INTRAVENOUS EVERY 6 HOURS
Status: ACTIVE | OUTPATIENT
Start: 2017-09-15 | End: 2017-09-16

## 2017-09-15 RX ORDER — ACETAMINOPHEN 325 MG/1
650 TABLET ORAL EVERY 4 HOURS PRN
Status: DISCONTINUED | OUTPATIENT
Start: 2017-09-18 | End: 2017-09-18 | Stop reason: HOSPADM

## 2017-09-15 RX ORDER — ONDANSETRON 2 MG/ML
4 INJECTION INTRAMUSCULAR; INTRAVENOUS EVERY 6 HOURS PRN
Status: DISCONTINUED | OUTPATIENT
Start: 2017-09-15 | End: 2017-09-18 | Stop reason: HOSPADM

## 2017-09-15 RX ORDER — SCOLOPAMINE TRANSDERMAL SYSTEM 1 MG/1
1 PATCH, EXTENDED RELEASE TRANSDERMAL ONCE
Status: DISCONTINUED | OUTPATIENT
Start: 2017-09-15 | End: 2017-09-15

## 2017-09-15 RX ORDER — AMOXICILLIN 250 MG
1-2 CAPSULE ORAL 2 TIMES DAILY
Status: DISCONTINUED | OUTPATIENT
Start: 2017-09-15 | End: 2017-09-18 | Stop reason: HOSPADM

## 2017-09-15 RX ORDER — FENTANYL CITRATE 50 UG/ML
INJECTION, SOLUTION INTRAMUSCULAR; INTRAVENOUS PRN
Status: DISCONTINUED | OUTPATIENT
Start: 2017-09-15 | End: 2017-09-15

## 2017-09-15 RX ORDER — HYDROMORPHONE HYDROCHLORIDE 1 MG/ML
.3-.5 INJECTION, SOLUTION INTRAMUSCULAR; INTRAVENOUS; SUBCUTANEOUS EVERY 30 MIN PRN
Status: DISCONTINUED | OUTPATIENT
Start: 2017-09-15 | End: 2017-09-18 | Stop reason: HOSPADM

## 2017-09-15 RX ORDER — IBUPROFEN 400 MG/1
400-800 TABLET, FILM COATED ORAL EVERY 6 HOURS PRN
Status: DISCONTINUED | OUTPATIENT
Start: 2017-09-20 | End: 2017-09-16

## 2017-09-15 RX ADMIN — SODIUM CHLORIDE, POTASSIUM CHLORIDE, SODIUM LACTATE AND CALCIUM CHLORIDE: 600; 310; 30; 20 INJECTION, SOLUTION INTRAVENOUS at 10:45

## 2017-09-15 RX ADMIN — SODIUM CHLORIDE, SODIUM LACTATE, POTASSIUM CHLORIDE, CALCIUM CHLORIDE AND DEXTROSE MONOHYDRATE: 5; 600; 310; 30; 20 INJECTION, SOLUTION INTRAVENOUS at 20:45

## 2017-09-15 RX ADMIN — SODIUM CITRATE AND CITRIC ACID MONOHYDRATE 30 ML: 500; 334 SOLUTION ORAL at 10:56

## 2017-09-15 RX ADMIN — EPHEDRINE SULFATE 10 MG: 50 INJECTION, SOLUTION INTRAVENOUS at 11:36

## 2017-09-15 RX ADMIN — SODIUM CHLORIDE, POTASSIUM CHLORIDE, SODIUM LACTATE AND CALCIUM CHLORIDE 1000 ML: 600; 310; 30; 20 INJECTION, SOLUTION INTRAVENOUS at 10:53

## 2017-09-15 RX ADMIN — SODIUM CHLORIDE, POTASSIUM CHLORIDE, SODIUM LACTATE AND CALCIUM CHLORIDE: 600; 310; 30; 20 INJECTION, SOLUTION INTRAVENOUS at 11:43

## 2017-09-15 RX ADMIN — PHENYLEPHRINE HYDROCHLORIDE 100 MCG: 10 INJECTION, SOLUTION INTRAMUSCULAR; INTRAVENOUS; SUBCUTANEOUS at 11:36

## 2017-09-15 RX ADMIN — BUPIVACAINE HYDROCHLORIDE IN DEXTROSE 12 MG: 7.5 INJECTION, SOLUTION SUBARACHNOID at 11:32

## 2017-09-15 RX ADMIN — KETOROLAC TROMETHAMINE 30 MG: 30 INJECTION, SOLUTION INTRAMUSCULAR at 14:52

## 2017-09-15 RX ADMIN — OXYTOCIN-SODIUM CHLORIDE 0.9% IV SOLN 30 UNIT/500ML 250 ML/HR: 30-0.9/5 SOLUTION at 11:53

## 2017-09-15 RX ADMIN — ACETAMINOPHEN 975 MG: 325 TABLET, FILM COATED ORAL at 17:38

## 2017-09-15 RX ADMIN — OXYTOCIN-SODIUM CHLORIDE 0.9% IV SOLN 30 UNIT/500ML 100 ML/HR: 30-0.9/5 SOLUTION at 15:09

## 2017-09-15 RX ADMIN — KETOROLAC TROMETHAMINE 30 MG: 30 INJECTION, SOLUTION INTRAMUSCULAR at 20:45

## 2017-09-15 RX ADMIN — CEFAZOLIN SODIUM 3 G: 10 INJECTION, POWDER, FOR SOLUTION INTRAVENOUS at 11:19

## 2017-09-15 RX ADMIN — MORPHINE SULFATE 0.2 MG: 1 INJECTION EPIDURAL; INTRATHECAL; INTRAVENOUS at 11:32

## 2017-09-15 RX ADMIN — ONDANSETRON 4 MG: 2 INJECTION INTRAMUSCULAR; INTRAVENOUS at 12:00

## 2017-09-15 RX ADMIN — SENNOSIDES AND DOCUSATE SODIUM 1 TABLET: 8.6; 5 TABLET ORAL at 20:45

## 2017-09-15 RX ADMIN — FENTANYL CITRATE 15 MCG: 50 INJECTION, SOLUTION INTRAMUSCULAR; INTRAVENOUS at 11:32

## 2017-09-15 NOTE — IP AVS SNAPSHOT
[facility]    [address]    [facility] [address]    Phone:  [phone]    Fax:  [phone]                                       After Visit Summary   9/15/2017    Jie Salas    MRN: [id number]           After Visit Summary Signature Page     I have received my discharge instructions, and my questions have been answered. I have discussed any challenges I see with this plan with the nurse or doctor.    ..........................................................................................................................................  Patient/Patient Representative Signature      ..........................................................................................................................................  Patient Representative Print Name and Relationship to Patient    ..................................................               ................................................  Date                                            Time    ..........................................................................................................................................  Reviewed by Signature/Title    ...................................................              ..............................................  Date                                                            Time

## 2017-09-15 NOTE — OP NOTE
[facility]  Obstetrics Service Operative Report    Surgery Date:  September 15, 2017  Surgeon(s): Nile Gregory  Assistants: Ashwini Macedo MD (necessary due to elevated BMI and repeat cs    Preoperative Diagnoses:  1.  Intrauterine pregnancy at 39w4d  2.  History of  section x1    Postoperative diagnoses: Same     Procedure performed:  Repeat low segment transverse  section via pfann incision with 2 layer uterine closure    Anesthesia:  Spinal with duramorph  Est Blood Loss (mL): 500 mL  Fluid replacement: 1550 mL lactated Ringer's.   Specimens: None  Complications: None      Operative findings: Single viable female infant at 1152 hours on 09/15/2017 . Apgars of 9 and 9 at one and five minutes.  Birth weight (GM): Pendign.  Fetal presentation: vtx.  Position: GRACIE  Amniotic fluid: clear.  Placenta intact with 3 vessel cord.   Normal appearing uterus, fallopian tubes, ovaries.  No intraabdominal adhesions.  No abdominal wall adhesions      Indication: Jie Salas is a 31 year old, , who was admitted at 39w4d by LMP c/w [facility] US ultrasound for RLTCS. The risks, benefits, and alternatives of  delivery were explained and the patient agreed to proceed.     Procedure details:  After obtaining informed consent with, the patient was taken to the operating room. She received adequate IV antibiotcs prior to the skin incision. She was placed in the dorsal supine position with a leftward tilt and prepped and draped in the usual sterile fashion. Following test of adequate spinal anesthesia, the abdomen was entered through a through her previous scar. The skin incision was made sharply and carried through the subcutaneous tissue to the fascia.  Fascia was incised in the midline and extended laterally with the English scissors.  The superior margin of the fascial incision was grasped with Kocher clamps and dissected from the underlying muscle sharp and blunt dissecton, which  was then repeated at the lower margin of the fascial incision.  The muscle was  in the midline.  The peritoneum was entered bluntly and the opening extended by digital dissection with care to avoid the bladder. A bladder blade was placed. The lower segment of the uterus was opened sharply in a transverse fashion and extended with digital pressure. The infant's head was elevated to the level of the hysterotomy and was delivered atraumatically. The cord was doubly clamped and cut and the infant was handed off to the waiting [facility] staff. A segment of the cord was cut and set aside for cord gases if needed. The placenta was manually extracted.  The uterus was exteriorized from the abdomen and cleared of all clots and debris.  The uterus was massaged and was noted to be firm.  Oxytocin was given through the running IV.  With vigorous massage as well as administration of oxytocin, good uterine tone was achieved. The hysterotomy was repaired with 0-vicryl suture in a running locked fashion. A 2nd layer of 0-monocryl was used to imbricate the incision and good hemostasis was achieved. The bladder flap was inspected and found to be hemostatic.  The hysterotomy was again inspected and found to have no active sites of bleeding.  The abdominal wall was examined and found to have no active sites of bleeding.  The peritoneum was closed with running 3-0 vicryl.  The fascia was closed with a running suture of 0-vicryl.  Subcutaneous tissue was irrigated. Areas that were oozing were controlled with cautery. The subcutaneous tissue was re approximated with 3-0 vicryl. The skin was closed with 4-0 vicryl. The patient tolerated the procedure well and was taken to the recovery room in stable condition. All sponge, needle and instrument counts were correct x2.     Nile Gregory DO  September 15, 2017  [phone]

## 2017-09-15 NOTE — IP AVS SNAPSHOT
MRN:[id number]                      After Visit Summary   9/15/2017    Jie Salas    MRN: [id number]           Thank you!     Thank you for choosing [facility] for your care. Our goal is always to provide you with excellent care. Hearing back from our patients is one way we can continue to improve our services. Please take a few minutes to complete the written survey that you may receive in the mail after you visit. If you would like to speak to someone directly about your visit please contact Patient Relations at [phone]. Thank you!          Patient Information     Date Of Birth          1985        About your hospital stay     You were admitted on:  September 15, 2017 You last received care in the:  [facility] Postpartum    You were discharged on:  2017       Who to Call     For medical emergencies, please call 911.  For non-urgent questions about your medical care, please call your primary care provider or clinic, [phone]  For questions related to your surgery, please call your surgery clinic        Attending Provider     Provider Specialty    Nile Gregory MD OB/Gyn       Primary Care Provider Office Phone # Fax #    Earnestine Grubbs [phone]      After Care Instructions     Activity       Review discharge instructions            Diet       Resume previous diet            Discharge Instructions - Postpartum visit       Schedule postpartum visit with your provider and return to clinic in 6 weeks.                  Further instructions from your care team       Postop  Birth Instructions  Lactation [phone]    Activity       Do not lift more than 10 pounds for 6 weeks after surgery.  Ask family and friends for help when you need it.    No driving until you have stopped taking your pain medications (usually two weeks after surgery).    No heavy exercise or activity for 6 weeks.  Don't do anything that  will put a strain on your surgery site.    Don't strain when using the toilet.  Your care team may prescribe a stool softener if you have problems with your bowel movements.     To care for your incision:       Keep the incision clean and dry.    Do not soak your incision in water. No swimming or hot tubs until it has fully healed. You may soak in the bathtub if the water level is below your incision.    Do not use peroxide, gel, cream, lotion, or ointment on your incision.    Adjust your clothes to avoid pressure on your surgery site (check the elastic in your underwear for example).     You may see a small amount of clear or pink drainage and this is normal.  Check with your health care provider:       If the drainage increases or has an odor.    If the incision reddens, you have swelling, or develop a rash.    If you have increased pain and the medicine we prescribed doesn't help.    If you have a fever above 100.4 F (38 C) with or without chills when placing thermometer under your tongue.   The area around your incision (surgery wound), will feel numb.  This is normal. The numbness should go away in less than a year.     Keep your hands clean:  Always wash your hands before touching your incision (surgery wound). This helps reduce your risk of infection. If your hands aren't dirty, you may use an alcohol hand-rub to clean your hands. Keep your nails clean and short.    Call your healthcare provider if you have any of these symptoms:       You soak a sanitary pad with blood within 1 hour, or you see blood clots larger than a golf ball.    Bleeding that lasts more than 6 weeks.    Vaginal discharge that smells bad.    Severe pain, cramping or tenderness in your lower belly area.    A need to urinate more frequently (use the toilet more often), more urgently (use the toilet very quickly), or it burns when you urinate.    Nausea and vomiting.    Redness, swelling or pain around a vein in your leg.    Problems  "breastfeeding or a red or painful area on your breast.    Chest pain and cough or are gasping for air.    Problems with coping with sadness, anxiety or depression. If you have concerns about hurting yourself or the baby, call your provider immediately.      You have questions or concerns after you return home.                  Pending Results     No orders found from 2017 to 2017.            Statement of Approval     Ordered          17 0958  I have reviewed and agree with all the recommendations and orders detailed in this document.  EFFECTIVE NOW     Approved and electronically signed by:  Yaklein Rivera MD             Admission Information     Date & Time Provider Department Dept. Phone    9/15/2017 Nile Gregory MD [facility] Postpartum [phone]      Your Vitals Were     Blood Pressure Pulse Temperature Respirations Height Weight    123/68 88 99.2  F (37.3  C) (Oral) 18 1.702 m (5' 7\") 121.1 kg (267 lb)    Last Period Pulse Oximetry BMI (Body Mass Index)             2016 98% 44.43 kg/m2         [facility] Information     [facility] lets you send messages to your doctor, view your test results, renew your prescriptions, schedule appointments and more. To sign up, go to [email or web address] . Click on \"Log in\" on the left side of the screen, which will take you to the Welcome page. Then click on \"Sign up Now\" on the right side of the page.     You will be asked to enter the access code listed below, as well as some personal information. Please follow the directions to create your username and password.     Your access code is: [email or web address]  Expires: 2017  6:16 PM     Your access code will  in 90 days. If you need help or a new code, please call your [facility] clinic or [phone].        Care EveryWhere ID     This is your Care EveryWhere ID. This could be used by other organizations to access your [facility] medical records  [id number]        Equal Access " to Services     TABBY PARSONS : Chriss Hoff, aixa cardona, qaisidra colin. So [facility] [phone].    ATENCIÓN: Si habla español, tiene a carl disposición servicios gratuitos de asistencia lingüística. Llame al [phone].    We comply with applicable federal civil rights laws and [place] laws. We do not discriminate on the basis of race, color, national origin, age, disability sex, sexual orientation or gender identity.               Review of your medicines      UNREVIEWED medicines. Ask your doctor about these medicines        Dose / Directions    ZANTAC PO        Dose:  150 mg   Take 150 mg by mouth 2 times daily as needed   Refills:  0         CONTINUE these medicines which have NOT CHANGED        Dose / Directions    prenatal multivitamin plus iron 27-0.8 MG Tabs per tablet        Dose:  1 tablet   Take 1 tablet by mouth daily   Refills:  0       TYLENOL PO        Refills:  0                Protect others around you: Learn how to safely use, store and throw away your medicines at www.disposemymeds.org.             Medication List: This is a list of all your medications and when to take them. Check marks below indicate your daily home schedule. Keep this list as a reference.      Medications           Morning Afternoon Evening Bedtime As Needed    prenatal multivitamin plus iron 27-0.8 MG Tabs per tablet   Take 1 tablet by mouth daily                                TYLENOL PO   Last time this was given:  975 mg on 9/18/2017  9:41 AM                                ZANTAC PO   Take 150 mg by mouth 2 times daily as needed

## 2017-09-15 NOTE — ANESTHESIA CARE TRANSFER NOTE
Patient: Jie Salas    Procedure(s):   SECTION  - Wound Class: II-Clean Contaminated    Diagnosis: previous  Diagnosis Additional Information: No value filed.    Anesthesia Type:   Spinal     Note:  Airway :Room Air  Patient transferred to:Labor and Delivery  Comments: Pt VSS, comfortable, tx to mac rm 2 on L/D, monitors on and report to RN      Vitals: (Last set prior to Anesthesia Care Transfer)    CRNA VITALS  9/15/2017 1059 - 9/15/2017 1159      9/15/2017             Pulse: 119    SpO2: 98 %      CRNA VITALS  9/15/2017 1206 - 9/15/2017 1241      9/15/2017             NIBP: 111/65    NIBP Mean: 76                Electronically Signed By: DAO Sosa CRNA  September 15, 2017  12:41 PM

## 2017-09-15 NOTE — ANESTHESIA POSTPROCEDURE EVALUATION
Patient: Jie Salas    Procedure(s):   SECTION  - Wound Class: II-Clean Contaminated    Diagnosis:previous  Diagnosis Additional Information: Preoperative Diagnoses:  1.  Intrauterine pregnancy at 39w4d  2.  History of  section x1     Postoperative diagnoses: Same      Procedure performed:  Repeat low segment transverse  section via pfann incision with 2 layer uterine closu, re    Anesthesia Type:  Spinal    Note:  Anesthesia Post Evaluation    Patient location during evaluation: OB PACU  Patient participation: Able to fully participate in evaluation  Level of consciousness: awake  Pain management: adequate  Airway patency: patent  Cardiovascular status: acceptable  Respiratory status: acceptable  Hydration status: acceptable  PONV: controlled     Anesthetic complications: None    Comments: Anticipate full return of neurologic function        Last vitals:  Vitals:    09/15/17 1004   BP: 118/74   Resp: 18   Temp: 98.2  F (36.8  C)         Electronically Signed By: Sea Ibrahim MD  September 15, 2017  12:43 PM

## 2017-09-15 NOTE — PLAN OF CARE
Problem: Goal Outcome Summary  Goal: Goal Outcome Summary  Outcome: Improving  Patient denies any nausea; bowel sounds are hypoactive but patient is hungry and asking to eat. Diet was upgraded to regular and she was instructed to order only soft foods which she did. She has tolerated them well and has been encouraged to keep drinking her water. Vitals have been WNL; and her vaginal flow is also WNL. She has been very attentive to  and is nursing often. Will continue to monitor.

## 2017-09-15 NOTE — PLAN OF CARE
Data: Jie Salas transferred to [facility] via cart at 1450. Baby transferred via parent's arms.  Action: Receiving unit notified of transfer: Yes. Patient and family notified of room change. Report given to Diandra MULLER at 1521. Belongings sent to receiving unit. Accompanied by Registered Nurse. Oriented patient to surroundings. Call light within reach. ID bands double-checked with receiving RN.  Response: Patient tolerated transfer and is stable.

## 2017-09-15 NOTE — ANESTHESIA PROCEDURE NOTES
Peripheral nerve/Neuraxial procedure note : intrathecal  Pre-Procedure  Performed by JAZMYN GONZALEZ  Referred by ORI  Location: OR    Procedure Times:9/15/2017 11:29 AM and 9/15/2017 11:32 AM  Pre-Anesthestic Checklist: patient identified, IV checked, risks and benefits discussed, informed consent, monitors and equipment checked, pre-op evaluation and at physician/surgeon's request    Timeout  Correct Patient: Yes   Correct Procedure: Yes   Correct Site: Yes   Correct Laterality: N/A   Correct Position: Yes   Site Marked: N/A   .   Procedure Documentation  ASA 2  Diagnosis:intrauterine pregnancy, previous .    Procedure:    Intrathecal.  Insertion Site:L3-4  (midline approach)      Patient Prep;mask, sterile gloves, povidone-iodine 7.5% surgical scrub.  .  Needle: Gena tip Spinal Needle (gauge): 25  Spinal/LP Needle Length (inches): 3.5 # of attempts: 1 and # of redirects:  Introducer used .       Assessment/Narrative  Paresthesias: No.  .  .  clear CSF fluid removed while sitting   .

## 2017-09-15 NOTE — PLAN OF CARE
, 39 4/7 weeks gestation, here for repeat  section. Monitors explained and applied. Dr. Gregory updated, orders received. Allergic to Penicillin, gets hives. Clarified with pharmacy, OK for patient to get Ancef. History and prenatal reviewed. Consent signed. 1152- delivery of viable female. Lusty cry. 1155- placed skin to skin per patient request.

## 2017-09-16 LAB — HGB BLD-MCNC: 12.2 G/DL (ref 11.7–15.7)

## 2017-09-16 PROCEDURE — 40000084 ZZH STATISTIC IP LACTATION SERVICES 16-30 MIN

## 2017-09-16 PROCEDURE — 85018 HEMOGLOBIN: CPT | Performed by: OBSTETRICS & GYNECOLOGY

## 2017-09-16 PROCEDURE — 36415 COLL VENOUS BLD VENIPUNCTURE: CPT | Performed by: OBSTETRICS & GYNECOLOGY

## 2017-09-16 PROCEDURE — 12000027 ZZH R&B OB

## 2017-09-16 PROCEDURE — 25000128 H RX IP 250 OP 636: Performed by: ANESTHESIOLOGY

## 2017-09-16 PROCEDURE — 25000128 H RX IP 250 OP 636: Performed by: OBSTETRICS & GYNECOLOGY

## 2017-09-16 PROCEDURE — 25000132 ZZH RX MED GY IP 250 OP 250 PS 637: Performed by: OBSTETRICS & GYNECOLOGY

## 2017-09-16 RX ORDER — IBUPROFEN 400 MG/1
400-800 TABLET, FILM COATED ORAL EVERY 6 HOURS PRN
Status: DISCONTINUED | OUTPATIENT
Start: 2017-09-16 | End: 2017-09-18 | Stop reason: HOSPADM

## 2017-09-16 RX ADMIN — ACETAMINOPHEN 975 MG: 325 TABLET, FILM COATED ORAL at 09:43

## 2017-09-16 RX ADMIN — KETOROLAC TROMETHAMINE 30 MG: 30 INJECTION, SOLUTION INTRAMUSCULAR at 13:59

## 2017-09-16 RX ADMIN — ACETAMINOPHEN 975 MG: 325 TABLET, FILM COATED ORAL at 17:53

## 2017-09-16 RX ADMIN — IBUPROFEN 800 MG: 400 TABLET ORAL at 20:02

## 2017-09-16 RX ADMIN — RANITIDINE HYDROCHLORIDE 150 MG: 150 TABLET, FILM COATED ORAL at 20:02

## 2017-09-16 RX ADMIN — RANITIDINE HYDROCHLORIDE 150 MG: 150 TABLET, FILM COATED ORAL at 13:31

## 2017-09-16 RX ADMIN — ACETAMINOPHEN 975 MG: 325 TABLET, FILM COATED ORAL at 01:41

## 2017-09-16 RX ADMIN — SENNOSIDES AND DOCUSATE SODIUM 1 TABLET: 8.6; 5 TABLET ORAL at 08:12

## 2017-09-16 RX ADMIN — KETOROLAC TROMETHAMINE 30 MG: 30 INJECTION, SOLUTION INTRAMUSCULAR at 08:11

## 2017-09-16 RX ADMIN — KETOROLAC TROMETHAMINE 30 MG: 30 INJECTION, SOLUTION INTRAMUSCULAR at 01:42

## 2017-09-16 NOTE — PROGRESS NOTES
"[facility]   Obstetrics Post-Op / POD # 1         Interval History:   Doing well.  Pain is controlled.   Good appetite.  Denies chest pain, shortness of breath, nausea or vomiting.  Breastfeeding well.            Significant Problems:      Patient Active Problem List   Diagnosis     Encounter for triage in pregnant patient     Indication for care in labor or delivery     S/P  section     Labor and delivery, indication for care             Review of Systems:    The patient denies any chest pain, shortness of breath, excessive pain, fever, chills, purulent drainage from the wound, nausea or vomiting.          Medications:   All medications related to the patient's surgery have been reviewed          Physical Exam:   Patient Vitals for the past 24 hrs:   BP Temp Temp src Pulse Resp SpO2 Height   17 0811 106/63 99  F (37.2  C) Oral 84 18 - -   17 0540 99/62 98.6  F (37  C) Oral - 18 98 % -   17 0142 98/57 98.1  F (36.7  C) Oral - 16 98 % -   09/15/17 2045 115/63 98.2  F (36.8  C) Oral - 18 98 % -   09/15/17 1923 111/61 - - 83 18 100 % -   09/15/17 1738 105/69 - - 78 18 100 % -   09/15/17 1631 123/72 97.6  F (36.4  C) Oral 80 18 100 % -   09/15/17 1530 114/83 - - 96 - - -   09/15/17 1446 116/63 - - - - 97 % -   09/15/17 1436 115/68 - - - - - -   09/15/17 1425 119/69 - - - - 98 % -   09/15/17 1410 115/65 - - - - 99 % -   09/15/17 1352 114/56 - - - - - -   09/15/17 1337 122/63 - - - - 98 % -   09/15/17 1321 114/59 - - - - - -   09/15/17 1319 - - - - - 98 % -   09/15/17 1306 114/53 - - - - 97 % -   09/15/17 1254 - - - - - 97 % -   09/15/17 1250 112/67 - - - - - -   09/15/17 1245 126/57 - - - - - -   09/15/17 1244 - - - - - 96 % -   09/15/17 1241 119/66 97.7  F (36.5  C) Oral - 18 96 % -   09/15/17 1025 - - - - - - 1.702 m (5' 7\")   09/15/17 1004 118/74 98.2  F (36.8  C) Oral - 18 - -       Intake/Output Summary (Last 24 hours) at 17 0831  Last data filed at 17 0654   Gross per " 24 hour   Intake             3673 ml   Output             3425 ml   Net              248 ml       All vitals stable  Uterus firm and nontender  Wound clean and dry with minimal or no drainage.  Surrounding skin with minimal erythema.  Musculoskeletal:  there is no redness, warmth, or swelling of the lower extremities          Data:   All laboratory data related to this surgery reviewed  Lab Results   Component Value Date    HGB 12.2 2017    HGB 13.0 09/15/2017    HGB 13.8 2016            Assessment and Plan:    Assessment:     Post-operative day #1  Low transverse repeat  section   Heartburn  Doing well.  Clean wound without signs of infection.  No immediate surgical complications identified.  Pain well-controlled.      Plan:  Ambulation encouraged  Zantac 150 mg BID  Reportable signs and symptoms dicussed with the patient  Anticipate discharge in 2 days      Yakelin Babs Rivera MD  2017  8:31 AM

## 2017-09-16 NOTE — PLAN OF CARE
Problem: Goal Outcome Summary  Goal: Goal Outcome Summary  Outcome: Improving  Patient meeting expected outcomes. IV saline locked, chavez draining large amounts of light yellow urine. Tolerating food and fluids. Ambulated to bathroom, stand by assist. Pain well controlled with scheduled tylenol and toradol. Breastfeeding going well. Plans to start pumping today sometime for history of breast reduction. Bonding with .

## 2017-09-16 NOTE — PLAN OF CARE
Problem: Goal Outcome Summary  Goal: Goal Outcome Summary  Outcome: Improving  Patient is stable, actively working on breastfeeding, gaining strength and independence, goal outcomes met thus far.

## 2017-09-17 PROCEDURE — 25000132 ZZH RX MED GY IP 250 OP 250 PS 637: Performed by: OBSTETRICS & GYNECOLOGY

## 2017-09-17 PROCEDURE — 12000027 ZZH R&B OB

## 2017-09-17 RX ADMIN — IBUPROFEN 800 MG: 400 TABLET ORAL at 01:55

## 2017-09-17 RX ADMIN — ACETAMINOPHEN 975 MG: 325 TABLET, FILM COATED ORAL at 01:55

## 2017-09-17 RX ADMIN — ACETAMINOPHEN 975 MG: 325 TABLET, FILM COATED ORAL at 17:54

## 2017-09-17 RX ADMIN — RANITIDINE HYDROCHLORIDE 150 MG: 150 TABLET, FILM COATED ORAL at 08:05

## 2017-09-17 RX ADMIN — IBUPROFEN 800 MG: 400 TABLET ORAL at 20:33

## 2017-09-17 RX ADMIN — RANITIDINE HYDROCHLORIDE 150 MG: 150 TABLET, FILM COATED ORAL at 20:33

## 2017-09-17 RX ADMIN — IBUPROFEN 800 MG: 400 TABLET ORAL at 08:05

## 2017-09-17 RX ADMIN — SENNOSIDES AND DOCUSATE SODIUM 1 TABLET: 8.6; 5 TABLET ORAL at 08:05

## 2017-09-17 RX ADMIN — IBUPROFEN 800 MG: 400 TABLET ORAL at 14:10

## 2017-09-17 RX ADMIN — ACETAMINOPHEN 975 MG: 325 TABLET, FILM COATED ORAL at 09:37

## 2017-09-17 NOTE — PLAN OF CARE
Problem: Postpartum ( Delivery) (Adult)  Goal: Signs and Symptoms of Listed Potential Problems Will be Absent or Manageable (Postpartum)  Signs and symptoms of listed potential problems will be absent or manageable by discharge/transition of care (reference Postpartum ( Delivery) (Adult) CPG).   See other note under goal outcome summary

## 2017-09-17 NOTE — PLAN OF CARE
Problem: Goal Outcome Summary  Goal: Goal Outcome Summary  Outcome: Improving  VSS, Bonding well with baby. Pain is well controlled with ibuprofen and tylenol. Hot pack given for cramping. Patient is ambulating independently. Tolerating regular diet well. Independent in self and baby cares. Patient is breastfeeding and pumping following feeds to increase milk supply. Also using SNS for donor milk. Infant requires stimulation to latch on breast. Once latched only a few swallows heard. When using SNS at the breast infant has a good latch, and many swallows heard. Meeting expected outcomes. Will continue to monitor.

## 2017-09-17 NOTE — PLAN OF CARE
Problem: Goal Outcome Summary  Goal: Goal Outcome Summary  Outcome: Improving  Patient is meeting expected goals for this shift. Voiding; tolerating a regular diet; ambulating without difficulty. Using oral medications for management of incisional pain. Very attentive to  - independent with self and infant cares. Report given to Nicolle GOMEZ RN

## 2017-09-17 NOTE — PLAN OF CARE
Problem: Goal Outcome Summary  Goal: Goal Outcome Summary  Outcome: Improving  Patient is improving for the most part, have noted elevated B/P today from baseline. Reflexes normal, no clonus, denies PIH symptoms. She does admit to more uterine cramping pain today, declines narcotic analgesia, T-pump applied this a.m. Which she likes and states that it helps some. She is gaining strength and independence, actively working on breastfeeding, understands that she needs to pump, but by the time she breastfeeds, finger feeds the donor milk, she seems to run out of time, ie soon time for next feeding! Encouraged her to be up in halls taking 3-4 walks per day.

## 2017-09-17 NOTE — PROGRESS NOTES
[facility]   Obstetrics Post-Op / POD # 2         Interval History:   Doing well.  Pain is controlled.   Good appetite.  Denies chest pain, shortness of breath, nausea or vomiting.  Breastfeeding well.  Pt having cramping.  She is taking Ibuprofen and Tylenol and she has an aquaK pad.          Significant Problems:      Patient Active Problem List   Diagnosis     Encounter for triage in pregnant patient     Indication for care in labor or delivery     S/P  section     Labor and delivery, indication for care             Review of Systems:    The patient denies any chest pain, shortness of breath, excessive pain, fever, chills, purulent drainage from the wound, nausea or vomiting.          Medications:   All medications related to the patient's surgery have been reviewed          Physical Exam:   Patient Vitals for the past 24 hrs:   BP Temp Temp src Pulse Resp   17 0800 132/76 99.8  F (37.7  C) Oral 81 16   17 0037 116/65 97.9  F (36.6  C) Oral 87 16   17 1849 127/76 98.3  F (36.8  C) Oral 84 18       Intake/Output Summary (Last 24 hours) at 17 1023  Last data filed at 17 1410   Gross per 24 hour   Intake                0 ml   Output              800 ml   Net             -800 ml       All vitals stable  Uterus firm and nontender  Wound clean and dry with minimal or no drainage.  Surrounding skin with minimal erythema.  Musculoskeletal:  there is no redness, warmth, or swelling of the lower extremities          Data:   All laboratory data related to this surgery reviewed  Lab Results   Component Value Date    HGB 12.2 2017    HGB 13.0 09/15/2017    HGB 13.8 2016            Assessment and Plan:    Assessment:     Post-operative day #2  Low transverse repeat  section  Doing well.  Clean wound.  No immediate surgical complications identified.  Pain well-controlled.      Plan:  Reportable signs and symptoms dicussed with the patient  Anticipate discharge  tomorrow  Remove dressing today.      Yakelin Rivera MD  September 17, 2017  10:23 AM

## 2017-09-17 NOTE — LACTATION NOTE
Lactation in to see patient. Patient has a history of breast reduction at 22 years old. Has nursed one other baby, but had to supplement. Observed a latch, with not many swallows heard. Baby now at 9.7 % weight loss. Discussed supplementing. Patient wants to use rodrigo breast milk. sns started with a good latch observed and swallows heard. Plan is to nurse with sns which she has done with first baby, and pump. Encouraged to call prn.

## 2017-09-18 VITALS
RESPIRATION RATE: 18 BRPM | TEMPERATURE: 99.2 F | SYSTOLIC BLOOD PRESSURE: 123 MMHG | DIASTOLIC BLOOD PRESSURE: 68 MMHG | HEART RATE: 88 BPM | HEIGHT: 67 IN | WEIGHT: 267 LBS | BODY MASS INDEX: 41.91 KG/M2 | OXYGEN SATURATION: 98 %

## 2017-09-18 PROCEDURE — 40000083 ZZH STATISTIC IP LACTATION SERVICES 1-15 MIN

## 2017-09-18 PROCEDURE — 25000132 ZZH RX MED GY IP 250 OP 250 PS 637: Performed by: OBSTETRICS & GYNECOLOGY

## 2017-09-18 RX ADMIN — IBUPROFEN 800 MG: 400 TABLET ORAL at 09:01

## 2017-09-18 RX ADMIN — ACETAMINOPHEN 975 MG: 325 TABLET, FILM COATED ORAL at 09:41

## 2017-09-18 RX ADMIN — IBUPROFEN 800 MG: 400 TABLET ORAL at 02:32

## 2017-09-18 RX ADMIN — ACETAMINOPHEN 975 MG: 325 TABLET, FILM COATED ORAL at 02:31

## 2017-09-18 RX ADMIN — RANITIDINE HYDROCHLORIDE 150 MG: 150 TABLET, FILM COATED ORAL at 09:02

## 2017-09-18 NOTE — PROGRESS NOTES
[facility]   Obstetrics Post-Op / POD # 3         Interval History:   Doing well.  Pain is controlled.   Good appetite.  Denies chest pain, shortness of breath, nausea or vomiting.  Breastfeeding well.  Passing flatus and urinating.          Significant Problems:      Patient Active Problem List   Diagnosis     Encounter for triage in pregnant patient     Indication for care in labor or delivery     S/P  section     Labor and delivery, indication for care             Review of Systems:    The patient denies any chest pain, shortness of breath, excessive pain, fever, chills, purulent drainage from the wound, nausea or vomiting.          Medications:   All medications related to the patient's surgery have been reviewed          Physical Exam:   Patient Vitals for the past 24 hrs:   BP Temp Temp src Pulse Resp   17 0800 123/68 99.2  F (37.3  C) Oral 88 18   17 2353 133/73 97.7  F (36.5  C) Oral 95 16   17 1652 121/70 98.7  F (37.1  C) Oral 90 16   17 1433 133/77 - - 85 -   17 1152 140/77 99.2  F (37.3  C) - 81 -     No intake or output data in the 24 hours ending 17 0956    All vitals stable  Uterus firm and nontender  Wound clean and dry with minimal or no drainage.  Surrounding skin with minimal erythema.  Musculoskeletal:  there is no redness, warmth, or swelling of the lower extremities          Data:   All laboratory data related to this surgery reviewed  Lab Results   Component Value Date    HGB 12.2 2017    HGB 13.0 09/15/2017    HGB 13.8 2016            Assessment and Plan:    Assessment:     Post-operative day #3  Low transverse repeat  section     Doing well.  Clean wound without signs of infection.  No immediate surgical complications identified.  Pain well-controlled.      Plan:  Reportable signs and symptoms dicussed with the patient  Discharge later today  F/U in 6 weeks      Yakelin Babs Rivera MD  2017  9:56 AM

## 2017-09-18 NOTE — PLAN OF CARE
Problem: Goal Outcome Summary  Goal: Goal Outcome Summary  Outcome: Improving  VSS, Bonding well with baby. Pain is well controlled with tylenol and ibuprofen. Patient is ambulating independently. Tolerating regular diet well. Independent in self and baby cares. Breastfeeding, pumping, and supplementing with donor milk. Discussed plans for feeding infant at home. Plans to continue to pump and supplement with formula. Meeting expected outcomes. Will continue to monitor.

## 2017-09-18 NOTE — DISCHARGE INSTRUCTIONS
Postop  Birth Instructions  Lactation [phone]    Activity       Do not lift more than 10 pounds for 6 weeks after surgery.  Ask family and friends for help when you need it.    No driving until you have stopped taking your pain medications (usually two weeks after surgery).    No heavy exercise or activity for 6 weeks.  Don't do anything that will put a strain on your surgery site.    Don't strain when using the toilet.  Your care team may prescribe a stool softener if you have problems with your bowel movements.     To care for your incision:       Keep the incision clean and dry.    Do not soak your incision in water. No swimming or hot tubs until it has fully healed. You may soak in the bathtub if the water level is below your incision.    Do not use peroxide, gel, cream, lotion, or ointment on your incision.    Adjust your clothes to avoid pressure on your surgery site (check the elastic in your underwear for example).     You may see a small amount of clear or pink drainage and this is normal.  Check with your health care provider:       If the drainage increases or has an odor.    If the incision reddens, you have swelling, or develop a rash.    If you have increased pain and the medicine we prescribed doesn't help.    If you have a fever above 100.4 F (38 C) with or without chills when placing thermometer under your tongue.   The area around your incision (surgery wound), will feel numb.  This is normal. The numbness should go away in less than a year.     Keep your hands clean:  Always wash your hands before touching your incision (surgery wound). This helps reduce your risk of infection. If your hands aren't dirty, you may use an alcohol hand-rub to clean your hands. Keep your nails clean and short.    Call your healthcare provider if you have any of these symptoms:       You soak a sanitary pad with blood within 1 hour, or you see blood clots larger than a golf ball.    Bleeding that  lasts more than 6 weeks.    Vaginal discharge that smells bad.    Severe pain, cramping or tenderness in your lower belly area.    A need to urinate more frequently (use the toilet more often), more urgently (use the toilet very quickly), or it burns when you urinate.    Nausea and vomiting.    Redness, swelling or pain around a vein in your leg.    Problems breastfeeding or a red or painful area on your breast.    Chest pain and cough or are gasping for air.    Problems with coping with sadness, anxiety or depression. If you have concerns about hurting yourself or the baby, call your provider immediately.      You have questions or concerns after you return home.

## 2017-09-18 NOTE — LACTATION NOTE
TRUE to see patient.  She has been nursing frequently and pumping post feeds to maximize her milk production due to infant weight loss and history of breast reduction.  She is pumping increasing amounts and using less donor milk.  She will discharge today.   recommends continuing to pump post feeds until follow up weight check and then 1-2x daily to assist with stimulation due to reduction hx. Hand expression post feeds was also recommended. She will be renting a medical grade pump.  She is also taking More Milk Special Blend.  She is aware she should continue to monitor infant weight gain closely and call with any questions or concerns.

## 2017-09-18 NOTE — PLAN OF CARE
Problem: Goal Outcome Summary  Goal: Goal Outcome Summary  Outcome: Adequate for Discharge Date Met:  09/18/17  Patient states understanding of education, discharge teaching, instructions, follow-up plan of care and denies any further questions of me at the time of discharge. Patient affect is bright, and she is discharged ambulatory in stable condition with baby and FOB escorted by me.

## 2017-10-22 NOTE — DISCHARGE SUMMARY
Hospital Discharge Summary      Date of admission: 9/15/2017  Date of discharge: 2017    Admission Dx:   -  at 39w4d   - hx of CS x's 1    Discharge Dx:   - s/p RLTCS    Hospital Procedures:   - spinal anesthesia  - RLTCS    Brief HPI:  Jie Salas is a now  who was admitted to the hospital on 9/15/2017 for surgical management of RLTCS.  She was evaluated in clinic by Dr. Gregory and the risks, benefits and alternatives of surgical management were discussed in detail with the patient.  She declined other forms of management.  Her past medical history is complicated by nothing.  Please see her preoperative H&P for full details of her history.    Hospital Course:  On 9/15/2017, Jie Salas underwent a RLTCS.  The procedure was uncomplicated.  EBL from the procedure was 500cc.  Findings at the time of the procedure included Single viable female infant at 1152 hours on 09/15/2017 . Apgars of 9 and 9 at one and five minutes.  Birth weight (GM): Pendign.  Fetal presentation: vtx.  Position: GRACIE  Amniotic fluid: clear.  Placenta intact with 3 vessel cord.   Normal appearing uterus, fallopian tubes, ovaries.  No intraabdominal adhesions.  No abdominal wall adhesions.  For full details of the procedure, please see her operative note.      Her postoperative course was uncomplicated.  Her preoperative hgb was 13 and her postoperative hgb was 12.1.  She was asymptomatic from this drop.  By POD#3, she was ambulating without dizziness, tolerating a regular diet without nausea or vomiting, passing flatus, and her pain was well controlled on oral pain meds.  She requested discharge to home and was deemed appropriate and stable to do so.        Discharge instructions:    She was discharged to home on POD#3 with the following instructions:   - No lifting > 20 lbs x 6 weeks   - Nothing in the vagina x 6 weeks   - No driving while on narcotic meds or for 2 weeks   - Call the clinic if you have a temp > 100.4,  heavy vaginal bleeding, pain not controlled with oral pain meds, severe constipation, severe nausea or vomiting, or abnormal drainage from your incision or your vagina.    Discharge meds:  The patient was discharged to home with the following pain medications:   Jie Salas   Home Medication Instructions CEDRIC:[id number]    Printed on:10/22/17 4757   Medication Information                      Acetaminophen (TYLENOL PO)               Prenatal Vit-Fe Fumarate-FA (PRENATAL MULTIVITAMIN  PLUS IRON) 27-0.8 MG TABS  Take 1 tablet by mouth daily             RaNITidine HCl (ZANTAC PO)  Take 150 mg by mouth 2 times daily as needed                  The patient was instructed to continue her home meds as previously prescribed by her primary MD.    Follow-up:  The patient was instructed to follow-up with Dr. Gregory in 6 weeks for a routine post-operative visit.    Nile Gregory

## 2018-06-15 NOTE — ANESTHESIA PREPROCEDURE EVALUATION
PAC NOTE:       ANESTHESIA PRE EVALUATION:  Anesthesia Evaluation     .             ROS/MED HX    ENT/Pulmonary:  - neg pulmonary ROS     Neurologic:       Cardiovascular:  - neg cardiovascular ROS       METS/Exercise Tolerance:     Hematologic:         Musculoskeletal:         GI/Hepatic:     (+) GERD       Renal/Genitourinary:         Endo:         Psychiatric:         Infectious Disease:         Malignancy:         Other:                     Physical Exam      Airway   Mallampati: II  TM distance: >3 FB  Neck ROM: full    Dental     Cardiovascular   Rhythm and rate: regular and normal      Pulmonary    breath sounds clear to auscultation             Anesthesia Plan      History & Physical Review  History and physical reviewed and following examination; no interval change.    ASA Status:  2 .    NPO Status:  > 8 hours    Plan for Spinal with Other induction. Maintenance will be Other.    PONV prophylaxis:  Ondansetron (or other 5HT-3) and Dexamethasone or Solumedrol       Postoperative Care  Postoperative pain management:  IV analgesics, Oral pain medications, Neuraxial analgesia and Multi-modal analgesia.      Consents  Anesthetic plan, risks, benefits and alternatives discussed with:  Patient and Spouse..                            .   no

## 2020-03-01 ENCOUNTER — HEALTH MAINTENANCE LETTER (OUTPATIENT)
Age: 35
End: 2020-03-01

## 2020-12-14 ENCOUNTER — HEALTH MAINTENANCE LETTER (OUTPATIENT)
Age: 35
End: 2020-12-14

## 2021-04-18 ENCOUNTER — HEALTH MAINTENANCE LETTER (OUTPATIENT)
Age: 36
End: 2021-04-18

## 2021-10-02 ENCOUNTER — HEALTH MAINTENANCE LETTER (OUTPATIENT)
Age: 36
End: 2021-10-02

## 2022-05-14 ENCOUNTER — HEALTH MAINTENANCE LETTER (OUTPATIENT)
Age: 37
End: 2022-05-14

## 2022-09-03 ENCOUNTER — HEALTH MAINTENANCE LETTER (OUTPATIENT)
Age: 37
End: 2022-09-03

## 2023-02-02 ENCOUNTER — LAB REQUISITION (OUTPATIENT)
Dept: LAB | Facility: CLINIC | Age: 38
End: 2023-02-02

## 2023-02-02 DIAGNOSIS — N92.0 EXCESSIVE AND FREQUENT MENSTRUATION WITH REGULAR CYCLE: ICD-10-CM

## 2023-02-02 LAB
ERYTHROCYTE [DISTWIDTH] IN BLOOD BY AUTOMATED COUNT: 12.5 % (ref 10–15)
HCT VFR BLD AUTO: 38.1 % (ref 35–47)
HGB BLD-MCNC: 12.8 G/DL (ref 11.7–15.7)
MCH RBC QN AUTO: 27.5 PG (ref 26.5–33)
MCHC RBC AUTO-ENTMCNC: 33.6 G/DL (ref 31.5–36.5)
MCV RBC AUTO: 82 FL (ref 78–100)
PLATELET # BLD AUTO: 338 10E3/UL (ref 150–450)
RBC # BLD AUTO: 4.66 10E6/UL (ref 3.8–5.2)
WBC # BLD AUTO: 11.5 10E3/UL (ref 4–11)

## 2023-02-02 PROCEDURE — 85027 COMPLETE CBC AUTOMATED: CPT | Performed by: OBSTETRICS & GYNECOLOGY

## 2023-06-03 ENCOUNTER — HEALTH MAINTENANCE LETTER (OUTPATIENT)
Age: 38
End: 2023-06-03

## 2023-08-09 RX ORDER — MULTIVITAMIN
1 TABLET ORAL DAILY
COMMUNITY
Start: 2021-12-21

## 2023-08-10 RX ORDER — OMEPRAZOLE 20 MG/1
20 TABLET, DELAYED RELEASE ORAL DAILY
COMMUNITY

## 2023-08-10 RX ORDER — LACTOBACILLUS RHAMNOSUS GG 10B CELL
1 CAPSULE ORAL 2 TIMES DAILY
COMMUNITY

## 2023-08-10 RX ORDER — MIRTAZAPINE 15 MG/1
10 TABLET, ORALLY DISINTEGRATING ORAL AT BEDTIME
COMMUNITY

## 2023-08-10 RX ORDER — OMEGA-3/DHA/EPA/FISH OIL 60 MG-90MG
CAPSULE ORAL
COMMUNITY

## 2023-08-10 RX ORDER — CETIRIZINE HYDROCHLORIDE 5 MG/1
5 TABLET ORAL DAILY
COMMUNITY

## 2023-08-17 ENCOUNTER — ANESTHESIA (OUTPATIENT)
Dept: SURGERY | Facility: CLINIC | Age: 38
End: 2023-08-17
Payer: COMMERCIAL

## 2023-08-17 ENCOUNTER — ANESTHESIA EVENT (OUTPATIENT)
Dept: SURGERY | Facility: CLINIC | Age: 38
End: 2023-08-17
Payer: COMMERCIAL

## 2023-08-17 ENCOUNTER — HOSPITAL ENCOUNTER (OUTPATIENT)
Facility: CLINIC | Age: 38
Discharge: HOME | End: 2023-08-17
Attending: OBSTETRICS & GYNECOLOGY | Admitting: OBSTETRICS & GYNECOLOGY
Payer: COMMERCIAL

## 2023-08-17 VITALS
WEIGHT: 278.3 LBS | HEIGHT: 64 IN | BODY MASS INDEX: 47.51 KG/M2 | OXYGEN SATURATION: 94 % | DIASTOLIC BLOOD PRESSURE: 95 MMHG | TEMPERATURE: 98 F | HEART RATE: 106 BPM | SYSTOLIC BLOOD PRESSURE: 135 MMHG | RESPIRATION RATE: 16 BRPM

## 2023-08-17 DIAGNOSIS — Z90.710 S/P LAPAROSCOPIC HYSTERECTOMY: Primary | ICD-10-CM

## 2023-08-17 LAB
ABO/RH(D): NORMAL
ANTIBODY SCREEN: NEGATIVE
HCG INTACT+B SERPL-ACNC: <1 MIU/ML
HCG UR QL: NEGATIVE
HGB BLD-MCNC: 13.6 G/DL (ref 11.7–15.7)
SPECIMEN EXPIRATION DATE: NORMAL

## 2023-08-17 PROCEDURE — 36415 COLL VENOUS BLD VENIPUNCTURE: CPT | Performed by: OBSTETRICS & GYNECOLOGY

## 2023-08-17 PROCEDURE — 250N000013 HC RX MED GY IP 250 OP 250 PS 637: Performed by: OBSTETRICS & GYNECOLOGY

## 2023-08-17 PROCEDURE — 86850 RBC ANTIBODY SCREEN: CPT | Performed by: OBSTETRICS & GYNECOLOGY

## 2023-08-17 PROCEDURE — 360N000080 HC SURGERY LEVEL 7, PER MIN: Performed by: OBSTETRICS & GYNECOLOGY

## 2023-08-17 PROCEDURE — 250N000011 HC RX IP 250 OP 636: Performed by: OBSTETRICS & GYNECOLOGY

## 2023-08-17 PROCEDURE — 710N000012 HC RECOVERY PHASE 2, PER MINUTE: Performed by: OBSTETRICS & GYNECOLOGY

## 2023-08-17 PROCEDURE — 84702 CHORIONIC GONADOTROPIN TEST: CPT | Performed by: OBSTETRICS & GYNECOLOGY

## 2023-08-17 PROCEDURE — 250N000009 HC RX 250

## 2023-08-17 PROCEDURE — 272N000001 HC OR GENERAL SUPPLY STERILE: Performed by: OBSTETRICS & GYNECOLOGY

## 2023-08-17 PROCEDURE — 88307 TISSUE EXAM BY PATHOLOGIST: CPT | Mod: TC | Performed by: OBSTETRICS & GYNECOLOGY

## 2023-08-17 PROCEDURE — 81025 URINE PREGNANCY TEST: CPT | Performed by: ANESTHESIOLOGY

## 2023-08-17 PROCEDURE — 999N000141 HC STATISTIC PRE-PROCEDURE NURSING ASSESSMENT: Performed by: OBSTETRICS & GYNECOLOGY

## 2023-08-17 PROCEDURE — 250N000011 HC RX IP 250 OP 636: Mod: JZ

## 2023-08-17 PROCEDURE — 250N000025 HC SEVOFLURANE, PER MIN: Performed by: OBSTETRICS & GYNECOLOGY

## 2023-08-17 PROCEDURE — 86901 BLOOD TYPING SEROLOGIC RH(D): CPT | Performed by: OBSTETRICS & GYNECOLOGY

## 2023-08-17 PROCEDURE — 85018 HEMOGLOBIN: CPT | Performed by: OBSTETRICS & GYNECOLOGY

## 2023-08-17 PROCEDURE — 258N000001 HC RX 258: Performed by: OBSTETRICS & GYNECOLOGY

## 2023-08-17 PROCEDURE — 250N000009 HC RX 250: Performed by: OBSTETRICS & GYNECOLOGY

## 2023-08-17 PROCEDURE — 370N000017 HC ANESTHESIA TECHNICAL FEE, PER MIN: Performed by: OBSTETRICS & GYNECOLOGY

## 2023-08-17 PROCEDURE — 258N000003 HC RX IP 258 OP 636: Performed by: ANESTHESIOLOGY

## 2023-08-17 PROCEDURE — 710N000009 HC RECOVERY PHASE 1, LEVEL 1, PER MIN: Performed by: OBSTETRICS & GYNECOLOGY

## 2023-08-17 PROCEDURE — 250N000011 HC RX IP 250 OP 636: Performed by: ANESTHESIOLOGY

## 2023-08-17 RX ORDER — FENTANYL CITRATE 50 UG/ML
50 INJECTION, SOLUTION INTRAMUSCULAR; INTRAVENOUS EVERY 5 MIN PRN
Status: DISCONTINUED | OUTPATIENT
Start: 2023-08-17 | End: 2023-08-17 | Stop reason: HOSPADM

## 2023-08-17 RX ORDER — SCOLOPAMINE TRANSDERMAL SYSTEM 1 MG/1
1 PATCH, EXTENDED RELEASE TRANSDERMAL ONCE
Status: COMPLETED | OUTPATIENT
Start: 2023-08-17 | End: 2023-08-17

## 2023-08-17 RX ORDER — KETAMINE HYDROCHLORIDE 10 MG/ML
INJECTION INTRAMUSCULAR; INTRAVENOUS PRN
Status: DISCONTINUED | OUTPATIENT
Start: 2023-08-17 | End: 2023-08-17

## 2023-08-17 RX ORDER — ONDANSETRON 2 MG/ML
4 INJECTION INTRAMUSCULAR; INTRAVENOUS EVERY 30 MIN PRN
Status: DISCONTINUED | OUTPATIENT
Start: 2023-08-17 | End: 2023-08-17 | Stop reason: HOSPADM

## 2023-08-17 RX ORDER — PROPOFOL 10 MG/ML
INJECTION, EMULSION INTRAVENOUS PRN
Status: DISCONTINUED | OUTPATIENT
Start: 2023-08-17 | End: 2023-08-17

## 2023-08-17 RX ORDER — CLINDAMYCIN PHOSPHATE 900 MG/50ML
900 INJECTION, SOLUTION INTRAVENOUS
Status: DISCONTINUED | OUTPATIENT
Start: 2023-08-17 | End: 2023-08-17

## 2023-08-17 RX ORDER — BUPIVACAINE HYDROCHLORIDE 2.5 MG/ML
INJECTION, SOLUTION INFILTRATION; PERINEURAL PRN
Status: DISCONTINUED | OUTPATIENT
Start: 2023-08-17 | End: 2023-08-17 | Stop reason: HOSPADM

## 2023-08-17 RX ORDER — SODIUM CHLORIDE, SODIUM LACTATE, POTASSIUM CHLORIDE, CALCIUM CHLORIDE 600; 310; 30; 20 MG/100ML; MG/100ML; MG/100ML; MG/100ML
INJECTION, SOLUTION INTRAVENOUS CONTINUOUS
Status: DISCONTINUED | OUTPATIENT
Start: 2023-08-17 | End: 2023-08-17 | Stop reason: HOSPADM

## 2023-08-17 RX ORDER — KETOROLAC TROMETHAMINE 30 MG/ML
INJECTION, SOLUTION INTRAMUSCULAR; INTRAVENOUS PRN
Status: DISCONTINUED | OUTPATIENT
Start: 2023-08-17 | End: 2023-08-17

## 2023-08-17 RX ORDER — CLINDAMYCIN PHOSPHATE 900 MG/50ML
900 INJECTION, SOLUTION INTRAVENOUS SEE ADMIN INSTRUCTIONS
Status: DISCONTINUED | OUTPATIENT
Start: 2023-08-17 | End: 2023-08-17

## 2023-08-17 RX ORDER — HYDROXYZINE HYDROCHLORIDE 25 MG/1
25 TABLET, FILM COATED ORAL
Status: DISCONTINUED | OUTPATIENT
Start: 2023-08-17 | End: 2023-08-17 | Stop reason: HOSPADM

## 2023-08-17 RX ORDER — METOPROLOL TARTRATE 1 MG/ML
1-2 INJECTION, SOLUTION INTRAVENOUS EVERY 5 MIN PRN
Status: DISCONTINUED | OUTPATIENT
Start: 2023-08-17 | End: 2023-08-17 | Stop reason: HOSPADM

## 2023-08-17 RX ORDER — CEFAZOLIN SODIUM/WATER 3 G/30 ML
3 SYRINGE (ML) INTRAVENOUS
Status: COMPLETED | OUTPATIENT
Start: 2023-08-17 | End: 2023-08-17

## 2023-08-17 RX ORDER — ONDANSETRON 4 MG/1
4 TABLET, ORALLY DISINTEGRATING ORAL EVERY 8 HOURS PRN
Qty: 4 TABLET | Refills: 0 | Status: SHIPPED | OUTPATIENT
Start: 2023-08-17

## 2023-08-17 RX ORDER — ONDANSETRON 4 MG/1
4 TABLET, ORALLY DISINTEGRATING ORAL EVERY 30 MIN PRN
Status: DISCONTINUED | OUTPATIENT
Start: 2023-08-17 | End: 2023-08-17 | Stop reason: HOSPADM

## 2023-08-17 RX ORDER — HYDROMORPHONE HCL IN WATER/PF 6 MG/30 ML
0.4 PATIENT CONTROLLED ANALGESIA SYRINGE INTRAVENOUS EVERY 5 MIN PRN
Status: DISCONTINUED | OUTPATIENT
Start: 2023-08-17 | End: 2023-08-17 | Stop reason: HOSPADM

## 2023-08-17 RX ORDER — OXYCODONE HYDROCHLORIDE 5 MG/1
5 TABLET ORAL
Status: COMPLETED | OUTPATIENT
Start: 2023-08-17 | End: 2023-08-17

## 2023-08-17 RX ORDER — OXYCODONE HYDROCHLORIDE 5 MG/1
5-10 TABLET ORAL EVERY 4 HOURS PRN
Qty: 20 TABLET | Refills: 0 | Status: SHIPPED | OUTPATIENT
Start: 2023-08-17 | End: 2023-08-17

## 2023-08-17 RX ORDER — LIDOCAINE HYDROCHLORIDE AND EPINEPHRINE 10; 10 MG/ML; UG/ML
INJECTION, SOLUTION INFILTRATION; PERINEURAL PRN
Status: DISCONTINUED | OUTPATIENT
Start: 2023-08-17 | End: 2023-08-17 | Stop reason: HOSPADM

## 2023-08-17 RX ORDER — FENTANYL CITRATE 50 UG/ML
INJECTION, SOLUTION INTRAMUSCULAR; INTRAVENOUS PRN
Status: DISCONTINUED | OUTPATIENT
Start: 2023-08-17 | End: 2023-08-17

## 2023-08-17 RX ORDER — DEXAMETHASONE SODIUM PHOSPHATE 4 MG/ML
INJECTION, SOLUTION INTRA-ARTICULAR; INTRALESIONAL; INTRAMUSCULAR; INTRAVENOUS; SOFT TISSUE PRN
Status: DISCONTINUED | OUTPATIENT
Start: 2023-08-17 | End: 2023-08-17

## 2023-08-17 RX ORDER — ONDANSETRON 2 MG/ML
INJECTION INTRAMUSCULAR; INTRAVENOUS PRN
Status: DISCONTINUED | OUTPATIENT
Start: 2023-08-17 | End: 2023-08-17

## 2023-08-17 RX ORDER — IBUPROFEN 800 MG/1
800 TABLET, FILM COATED ORAL ONCE
Status: DISCONTINUED | OUTPATIENT
Start: 2023-08-17 | End: 2023-08-17 | Stop reason: HOSPADM

## 2023-08-17 RX ORDER — GLYCOPYRROLATE 0.2 MG/ML
INJECTION, SOLUTION INTRAMUSCULAR; INTRAVENOUS PRN
Status: DISCONTINUED | OUTPATIENT
Start: 2023-08-17 | End: 2023-08-17

## 2023-08-17 RX ORDER — ACETAMINOPHEN 325 MG/1
975 TABLET ORAL ONCE
Status: COMPLETED | OUTPATIENT
Start: 2023-08-17 | End: 2023-08-17

## 2023-08-17 RX ORDER — HYDROMORPHONE HCL IN WATER/PF 6 MG/30 ML
0.2 PATIENT CONTROLLED ANALGESIA SYRINGE INTRAVENOUS EVERY 5 MIN PRN
Status: DISCONTINUED | OUTPATIENT
Start: 2023-08-17 | End: 2023-08-17 | Stop reason: HOSPADM

## 2023-08-17 RX ORDER — LIDOCAINE HYDROCHLORIDE 20 MG/ML
INJECTION, SOLUTION INFILTRATION; PERINEURAL PRN
Status: DISCONTINUED | OUTPATIENT
Start: 2023-08-17 | End: 2023-08-17

## 2023-08-17 RX ORDER — FENTANYL CITRATE 50 UG/ML
25 INJECTION, SOLUTION INTRAMUSCULAR; INTRAVENOUS EVERY 5 MIN PRN
Status: DISCONTINUED | OUTPATIENT
Start: 2023-08-17 | End: 2023-08-17 | Stop reason: HOSPADM

## 2023-08-17 RX ORDER — NEOSTIGMINE METHYLSULFATE 1 MG/ML
VIAL (ML) INJECTION PRN
Status: DISCONTINUED | OUTPATIENT
Start: 2023-08-17 | End: 2023-08-17

## 2023-08-17 RX ORDER — ACETAMINOPHEN 325 MG/1
975 TABLET ORAL ONCE
Status: DISCONTINUED | OUTPATIENT
Start: 2023-08-17 | End: 2023-08-17 | Stop reason: HOSPADM

## 2023-08-17 RX ORDER — DEXMEDETOMIDINE HYDROCHLORIDE 4 UG/ML
INJECTION, SOLUTION INTRAVENOUS PRN
Status: DISCONTINUED | OUTPATIENT
Start: 2023-08-17 | End: 2023-08-17

## 2023-08-17 RX ORDER — TRAMADOL HYDROCHLORIDE 50 MG/1
50 TABLET ORAL EVERY 4 HOURS PRN
Qty: 20 TABLET | Refills: 0 | Status: SHIPPED | OUTPATIENT
Start: 2023-08-17

## 2023-08-17 RX ADMIN — FENTANYL CITRATE 100 MCG: 50 INJECTION, SOLUTION INTRAMUSCULAR; INTRAVENOUS at 12:28

## 2023-08-17 RX ADMIN — ONDANSETRON 4 MG: 2 INJECTION INTRAMUSCULAR; INTRAVENOUS at 14:31

## 2023-08-17 RX ADMIN — SCOPALAMINE 1 PATCH: 1 PATCH, EXTENDED RELEASE TRANSDERMAL at 12:45

## 2023-08-17 RX ADMIN — LIDOCAINE HYDROCHLORIDE 30 MG: 20 INJECTION, SOLUTION INFILTRATION; PERINEURAL at 12:28

## 2023-08-17 RX ADMIN — ONDANSETRON 4 MG: 2 INJECTION INTRAMUSCULAR; INTRAVENOUS at 15:03

## 2023-08-17 RX ADMIN — FENTANYL CITRATE 25 MCG: 50 INJECTION, SOLUTION INTRAMUSCULAR; INTRAVENOUS at 15:29

## 2023-08-17 RX ADMIN — FENTANYL CITRATE 50 MCG: 50 INJECTION, SOLUTION INTRAMUSCULAR; INTRAVENOUS at 15:00

## 2023-08-17 RX ADMIN — GLYCOPYRROLATE 0.8 MG: 0.2 INJECTION, SOLUTION INTRAMUSCULAR; INTRAVENOUS at 14:31

## 2023-08-17 RX ADMIN — Medication 10 MG: at 12:51

## 2023-08-17 RX ADMIN — PROPOFOL 200 MG: 10 INJECTION, EMULSION INTRAVENOUS at 12:28

## 2023-08-17 RX ADMIN — FENTANYL CITRATE 50 MCG: 50 INJECTION, SOLUTION INTRAMUSCULAR; INTRAVENOUS at 15:15

## 2023-08-17 RX ADMIN — DEXMEDETOMIDINE 8 MCG: 100 INJECTION, SOLUTION, CONCENTRATE INTRAVENOUS at 12:50

## 2023-08-17 RX ADMIN — OXYCODONE HYDROCHLORIDE 5 MG: 5 TABLET ORAL at 15:46

## 2023-08-17 RX ADMIN — DEXMEDETOMIDINE 8 MCG: 100 INJECTION, SOLUTION, CONCENTRATE INTRAVENOUS at 13:31

## 2023-08-17 RX ADMIN — PROCHLORPERAZINE EDISYLATE 5 MG: 5 INJECTION INTRAMUSCULAR; INTRAVENOUS at 15:17

## 2023-08-17 RX ADMIN — NEOSTIGMINE METHYLSULFATE 4 MG: 1 INJECTION, SOLUTION INTRAVENOUS at 14:31

## 2023-08-17 RX ADMIN — HYDROMORPHONE HYDROCHLORIDE 1 MG: 1 INJECTION, SOLUTION INTRAMUSCULAR; INTRAVENOUS; SUBCUTANEOUS at 12:31

## 2023-08-17 RX ADMIN — Medication 10 MG: at 13:10

## 2023-08-17 RX ADMIN — ROCURONIUM BROMIDE 20 MG: 50 INJECTION, SOLUTION INTRAVENOUS at 13:05

## 2023-08-17 RX ADMIN — KETOROLAC TROMETHAMINE 30 MG: 30 INJECTION, SOLUTION INTRAMUSCULAR at 14:31

## 2023-08-17 RX ADMIN — ROCURONIUM BROMIDE 20 MG: 50 INJECTION, SOLUTION INTRAVENOUS at 13:55

## 2023-08-17 RX ADMIN — ROCURONIUM BROMIDE 50 MG: 50 INJECTION, SOLUTION INTRAVENOUS at 12:28

## 2023-08-17 RX ADMIN — Medication 10 MG: at 13:30

## 2023-08-17 RX ADMIN — DEXAMETHASONE SODIUM PHOSPHATE 8 MG: 4 INJECTION, SOLUTION INTRA-ARTICULAR; INTRALESIONAL; INTRAMUSCULAR; INTRAVENOUS; SOFT TISSUE at 12:28

## 2023-08-17 RX ADMIN — GLYCOPYRROLATE 0.2 MG: 0.2 INJECTION, SOLUTION INTRAMUSCULAR; INTRAVENOUS at 12:28

## 2023-08-17 RX ADMIN — Medication 3 G: at 12:23

## 2023-08-17 RX ADMIN — PROPOFOL 40 MCG/KG/MIN: 10 INJECTION, EMULSION INTRAVENOUS at 12:33

## 2023-08-17 RX ADMIN — DEXMEDETOMIDINE 8 MCG: 100 INJECTION, SOLUTION, CONCENTRATE INTRAVENOUS at 13:10

## 2023-08-17 RX ADMIN — DEXMEDETOMIDINE 8 MCG: 100 INJECTION, SOLUTION, CONCENTRATE INTRAVENOUS at 12:33

## 2023-08-17 RX ADMIN — ACETAMINOPHEN 975 MG: 325 TABLET, FILM COATED ORAL at 11:36

## 2023-08-17 RX ADMIN — MIDAZOLAM 2 MG: 1 INJECTION INTRAMUSCULAR; INTRAVENOUS at 12:23

## 2023-08-17 RX ADMIN — Medication 20 MG: at 12:33

## 2023-08-17 RX ADMIN — SODIUM CHLORIDE, POTASSIUM CHLORIDE, SODIUM LACTATE AND CALCIUM CHLORIDE: 600; 310; 30; 20 INJECTION, SOLUTION INTRAVENOUS at 12:23

## 2023-08-17 ASSESSMENT — ACTIVITIES OF DAILY LIVING (ADL)
ADLS_ACUITY_SCORE: 35

## 2023-08-17 NOTE — ANESTHESIA PROCEDURE NOTES
Airway       Patient location during procedure: OR       Procedure Start/Stop Times: 8/17/2023 12:30 PM  Staff -        CRNA: Suyapa Garniac APRN CRNA       Performed By: CRNA  Consent for Airway        Urgency: elective  Indications and Patient Condition       Indications for airway management: sage-procedural       Induction type:intravenous       Mask difficulty assessment: 1 - vent by mask    Final Airway Details       Final airway type: endotracheal airway       Successful airway: ETT - single  Endotracheal Airway Details        ETT size (mm): 7.0       Cuffed: yes       Successful intubation technique: direct laryngoscopy       DL Blade Type: MAC 3       Grade View of Cords: 3       Adjucts: stylet       Position: Right       Measured from: gums/teeth       Secured at (cm): 21       Bite block used: None    Post intubation assessment        Placement verified by: capnometry, equal breath sounds and chest rise        Number of attempts at approach: 1       Number of other approaches attempted: 0       Secured with: plastic tape       Ease of procedure: easy       Dentition: Unchanged    Medication(s) Administered   Medication Administration Time: 8/17/2023 12:30 PM

## 2023-08-17 NOTE — OP NOTE
[facility]  Operative Report    Name: Jie Salas  MRN: [id number]  : 1985   Date of Service: 2023     PREOPERATIVE DIAGNOSIS:    Abnormal uterine bleeding, failed medical management  Intramural leiomyoma  Chronic blood loss anemia  BMI 47    POSTOPERATIVE DIAGNOSIS:   Abnormal uterine bleeding, failed medical management  Intramural leiomyoma  Chronic blood loss anemia  BMI 47    PROCEDURE:  Robotic-assisted total laparoscopic hysterectomy, bilateral salpingectomy, cystoscopy, removal of Nexplanon    SURGEON:  Marilyn Armando MD    ASSISTANT(S):  Carla Donald MD    An assistant surgeon was utilized for the entire procedure due to the need for tissue retraction, dissection of vital structures, anticipated scar tissue secondary to previous  sections, prevention and management of blood loss, and reduction in overall operative and anesthesia time.     ANESTHESIA:  General endotracheal   ESTIMATED BLOOD LOSS:  25 mL.   IV FLUIDS:  1400 mL crystalloid.   URINE OUTPUT:  700 mL.     SPECIMENS: Uterus, cervix and bilateral fallopian tubes    FINDINGS:  Normal appearing uterus. Normal appearing bilateral ovaries and fallopian tubes. Normal bladder epithelium and bilateral ureteral jets on cystoscopy.      COMPLICATIONS: None apparent.      INDICATIONS: The patient is a 37 year old year old  with persistent menorrhagia for several years. She has tried multiple forms of medical management including oral contraceptives (discontinued due to high blood pressure), Mirena IUD (spontaneously expelled), progesterone only pill (Slynd- continued bleeding), Nexplanon (continued heavy bleeding). She underwent a hysteroscopy with polypectomy with benign pathology. She developed chronic blood loss anemia with hemoglobin of 10.9. A pelvic ultrasound demonstrated a 9.9cm uterus, EMS 5.9mm. A 2.7 cm intramural fibroid was noted. Ovaries unremarkable.  A long discussion was held with the patient regarding  management of her abnormal uterine bleeding and chronic blood loss anemia and she elected to proceed with surgical management with laparoscopic hysterectomy. Robotic assisted laparoscopy was recommended due to history of  section x2 and BMI 47. She also consented to bilateral salpingectomy. We reviewed the risks of infection, bleeding, injury to surrounding structures including bowel, bladder and ureters and possible need for laparotomy. She wished to proceed with the procedure.      DESCRIPTION OF PROCEDURE:    The patient was taken to the operating room and placed on the operating room table. SCDs were placed. General anesthesia was administered without difficulty and she received 3 grams of Ancef prior to skin incision for antibiotic prophylaxis. She was placed in the dorsal lithotomy position and secured to the table for the da Noam procedure. Examination under anesthesia was performed noting a mobile, normal sized uterus. At this time she was prepped and draped in the usual sterile fashion. A time out was performed to confirm the patient and the procedure.      Attention was turned to the patient's vagina. A Harrington catheter was sterilely placed. An open sided speculum was placed on the patient's vagina and the medium VCare manipulator was inserted in the usual fashion. Speculum was removed.     Attention was turned to the patient's abdomen. An 8 mm incision was made approximately 4 cm above the umbilicus after injection with 0.25% plain Marcaine. Veress needle was introduced into the peritoneal cavity and intraperitoneal placement was confirmed with low pressure and CO2 gas was applied after the water drop test. The abdomen was insufflated with CO2 gas. Veress needle was removed and the 8 mm camera trocar was introduced into the peritoneal cavity. Intraperitoneal placement was confirmed with the da Noam camera. The pelvis was examined with the above noted findings. The patient was then placed in  Trendelenburg. Two additional trocars were placed on the patient's right and left side of her abdomen with 8 mm da Noam trocars after injection with 0.25% plain Marcaine under direct laparoscopic visualization. A 4th port was placed on the patient's right upper quadrant which was a 5 mm trocar done under laparoscopic visualization.      The da Noam robot was then docked. The camera was reinserted. Next, the ureters were identified bilaterally below the IP ligaments. Bilateral salpingectomy was then performed and the tubes removed and sent to pathology. The left utero-ovarian ligament was cauterized and transected followed by the left round ligament. The broad ligament was then entered and bilaterally . The uterine arteries were skeletonized and the bladder flap was created. The same procedure was then performed on the right side. Uterine ovarian ligament was divided followed by the round ligament, the broad ligament and the bladder flap was created. The uterine arteries were then identified bilaterally and cauterized and divided. Additional bites were then taken bilaterally of the cardinal ligaments and the uterosacral ligament. The bladder was pushed down off the VCare cup. There was scarring from the bladder peritoneum to the anterior uterus and the bladder dissection was performed with great care to avoid bladder injury.    The VCare cup was easily seen and colpotomy was performed using monopolar scissors. The VCare was then removed. The uterus and cervix were easily removed. A sponge wrapped in a glove was placed in the patient's vagina then to achieve pneumoperitoneum. The pelvis was irrigated and suctioned and no bleeding was noted.     Both right and left hand instruments were then removed and a grasper was placed in the left hand and a pauly needle  was placed in the right hand. 0 V-Loc suture was then placed in the 8 mm port and the vaginal cuff was then closed using the da Noam in a  running fashion. Irrigation was once again performed. All pedicle sites were noted to be hemostatic. Both ureters were again identified and appeared to be peristalsing.     All instruments were then removed from the patient's abdomen. The da Noam robot was undocked. The abdomen was desufflated and all ports were removed. All skin incisions were closed with interrupted sutures of 4-0 Vicryl followed by dermabond. Attention was then turned to the patient's pelvis. The vaginal canal and vaginal cuff appeared intact and hemostatic. The glove was removed.    The Harrington was draining clear yellow urine at the end of the case. This was removed and cystoscopy was performed. A cystoscope was inserted into the urethra and bladder. The bladder epithelium appeared intact and without injury. Bilateral ureteral jets were noted. Approximately 200cc of normal saline remained in the bladder to allow for urination in the PACU. The cystoscope removed.     Attention was turned to the left arm. The area of her Nexplanon insertion scar was cleansed with betadine and infiltrated with 1% lidocaine with epinephrine. A stab incision was made over the scar. The Nexplanon was easily removed. A steri-strip was placed. A pressure bandage was placed.     The patient tolerated the procedure well. She was extubated and taken to the post-anesthesia care unit in satisfactory condition. All sponge, needle and instrument counts were correct x2 at the end of the procedure.     Marilyn Armando MD   [facility]  8/17/2023 2:56 PM

## 2023-08-17 NOTE — ANESTHESIA PREPROCEDURE EVALUATION
Anesthesia Pre-Procedure Evaluation    Patient: Jie Salas   MRN: [id number] : 1985        Procedure : Procedure(s):  Xi Robotic Assisted Total Laparoscopic Hysterectomy, Bilateral Salpingectomy, Cystoscopy, Removal of Nexplanon          Past Medical History:   Diagnosis Date    Breast disorder     Bilateral breast reduction    Hypertension     prior to weight loss in       Past Surgical History:   Procedure Laterality Date    APPENDECTOMY      2005     SECTION N/A 2015    Procedure:  SECTION;  Surgeon: Yakelin Rivera MD;  Location: [facility] OR     SECTION N/A 09/15/2017    Procedure:  SECTION;   SECTION ;  Surgeon: Nile Gregory MD;  Location: [facility] L+D    deviated septum      DILATION AND CURETTAGE      ENDOSCOPIC RELEASE CARPAL TUNNEL Right 2023    ENDOSCOPIC RELEASE CARPAL TUNNEL Left 2023    GALLBLADDER SURGERY      2008    MAMMOPLASTY REDUCTION BILATERAL      2007      Allergies   Allergen Reactions    Penicillins Hives      Social History     Tobacco Use    Smoking status: Never    Smokeless tobacco: Never   Substance Use Topics    Alcohol use: Yes     Comment: socially      Wt Readings from Last 1 Encounters:   23 104.3 kg (230 lb)        Anesthesia Evaluation            ROS/MED HX  ENT/Pulmonary:  - neg pulmonary ROS     Neurologic:  - neg neurologic ROS     Cardiovascular: Comment: Prior to weight loss    (+)  hypertension- -   -  - -                                      METS/Exercise Tolerance: >4 METS    Hematologic:  - neg hematologic  ROS     Musculoskeletal:  - neg musculoskeletal ROS     GI/Hepatic:  - neg GI/hepatic ROS     Renal/Genitourinary:  - neg Renal ROS     Endo: Comment: .Body mass index is 39.48 kg/m .        Psychiatric/Substance Use:  - neg psychiatric ROS     Infectious Disease:  - neg infectious disease ROS     Malignancy:  - neg malignancy ROS     Other:  - neg other ROS          Physical  Exam    Airway        Mallampati: II    Neck ROM: full     Respiratory Devices and Support         Dental           Cardiovascular   cardiovascular exam normal       Rhythm and rate: regular     Pulmonary   pulmonary exam normal                OUTSIDE LABS:  CBC:   Lab Results   Component Value Date    WBC 11.5 (H) 02/02/2023    WBC 14.0 (H) 09/22/2014    HGB 13.6 08/17/2023    HGB 12.8 02/02/2023    HCT 38.1 02/02/2023    HCT 39.5 09/22/2014     02/02/2023     09/22/2014     BMP: No results found for: NA, POTASSIUM, CHLORIDE, CO2, BUN, CR, GLC  COAGS: No results found for: PTT, INR, FIBR  POC: No results found for: BGM, HCG, HCGS  HEPATIC: No results found for: ALBUMIN, PROTTOTAL, ALT, AST, GGT, ALKPHOS, BILITOTAL, BILIDIRECT, JETT  OTHER: No results found for: PH, LACT, A1C, RYAN, PHOS, MAG, LIPASE, AMYLASE, TSH, T4, T3, CRP, SED    Anesthesia Plan    ASA Status:  3       Anesthesia Type: General.     - Airway: ETT   Induction: Intravenous, Propofol.   Maintenance: Balanced.        Consents    Anesthesia Plan(s) and associated risks, benefits, and realistic alternatives discussed. Questions answered and patient/representative(s) expressed understanding.     - Discussed:     - Discussed with:  Patient            Postoperative Care    Pain management: IV analgesics, Oral pain medications, Multi-modal analgesia.   PONV prophylaxis: Ondansetron (or other 5HT-3), Dexamethasone or Solumedrol     Comments:                Huan Garcia DO

## 2023-08-17 NOTE — ANESTHESIA CARE TRANSFER NOTE
Patient: Jie Salas    Procedure: Procedure(s):  Xi Robotic Assisted Total Laparoscopic Hysterectomy, Bilateral Salpingectomy, Cystoscopy  REMOVAL OF NEXPLANON       Diagnosis: Abnormal uterine bleeding [N93.9]  Diagnosis Additional Information: No value filed.    Anesthesia Type:   General     Note:    Oropharynx: oropharynx clear of all foreign objects  Level of Consciousness: drowsy  Oxygen Supplementation: face mask  Level of Supplemental Oxygen (L/min / FiO2): 6  Independent Airway: airway patency satisfactory and stable  Dentition: dentition unchanged  Vital Signs Stable: post-procedure vital signs reviewed and stable  Report to RN Given: handoff report given  Patient transferred to: PACU    Handoff Report: Identifed the Patient, Identified the Reponsible Provider, Reviewed the pertinent medical history, Discussed the surgical course, Reviewed Intra-OP anesthesia mangement and issues during anesthesia, Set expectations for post-procedure period and Allowed opportunity for questions and acknowledgement of understanding      Vitals:  Vitals Value Taken Time   /87 08/17/23 1448   Temp     Pulse 95 08/17/23 1450   Resp 17 08/17/23 1450   SpO2 100 % 08/17/23 1450   Vitals shown include unvalidated device data.    Electronically Signed By: DAO Branch CRNA  August 17, 2023  2:51 PM

## 2023-08-17 NOTE — DISCHARGE INSTRUCTIONS
HYSTERECTOMY DISCHARGE INSTRUCTIONS    WHAT TO EXPECT THE FIRST 24 HOURS:  -  You may have ice chips right after surgery.    -  You may start eating and drinking as soon as you feel ready.  This is usually about 2 hours after surgery.  We encourage you to drink water or other liquids to stay hydrated.    -  We will remove your urine catheter in the first 2 hours, if you have one.  You will be able to urinate on your own.    -  We will give you medicine to help with any pain or cramping.  You may also ask for medicine for itching, nausea and shivering.    -  We will instruct you on when to remove your bandage and how to care for your surgical cut (if you have one) at home.    -  You may begin showering the day after surgery or as directed by your surgeon.    -  You should be walking at least 3 times a day.    AFTER YOU'RE HOME:   -  Call your surgeon's office with any questions or if you need support.    -  Rest often.    -  Drink plenty of water (the amount you normally need to feel hydrated).  For many people this is 8 glasses a day.  Drinks with caffeine are okay in moderate amounts.    -  Try to take at least 3 short walks each day or as much as you are able.  Slowly increase your activity each day.    -  It is okay to climb stairs, but use the handrail in case you get dizzy.    -  Follow your surgeon's advice on when to return to normal activities and to work.    -  If you were given an incentive spirometer:  Try to cough, breathe deeply and use your breathing device (spirometer) every 15 to 30 minutes when awake.  This will help prevent breathing problems and fevers.    INCISION CARE:  -  The area around your incision may be numb.  This should go away over several months.    -  Keep the incision dry and covered for the first 24 hours.    -  Remove your bandage after 24 hours, even if you have some drainage.  Leave your skin open to the air.  It is okay to cover it during the day if your clothing rubs against  it.    -  If you have steri-strips (small pieces of tape) across the incision, leave them in place.  They will fall off on their own.  If they are still in place after a week, you can remove them.    -  Don't use ointments, oils, lotions or creams on your incision unless told to use them.    -  Avoid nicotine (smoking, vaping) if possible.  Nicotine can slow healing.    BATHING AND HYGIENE:  Once your surgeon says you have shower, please follow these tips:  -  Shower daily.  Gently soap your belly and let the soapy water run over the incision.  Don't rub.    -  Pat to dry.  Dry all areas fully, including any folds in the belly area.    -  You don't need to re-cover the wound.    PAIN:  It is common to have some pain and cramping when you go home.  -  Take pain medicine as needed.  Do not wait for the pain to become strong before taking pain medicine.    -  Follow the directions that came with your pain medicine.    -  Take acetaminophen (Tylenol), ibuprofen (Advil, Motrin), or naprosyn (Aleve) with food and a full glass of water.  This will reduce stomach upset.    While taking narcotic (opioid) pain medicine:  -  Don't take pain medicine if you have no pain or your pain is mild and tolerable.    -  Don't drive or use heavy machinery.  You may re-start driving and operating heavy machinery after you have stopped taking narcotics and you feel safe to do so.    -  Don't make important or legal decisions.    -  If you have nausea, vomiting or a rash, stop the medicine and call you doctor.    -  Opioid pain medicines can cause constipation.    When coughing or sneezing, you may want to hug a pillow for added support if you had surgery on your belly.  This may reduce pain.    If you had laparoscopic surgery:  You may feel some mild pain in your belly, chest or shoulder within the first 48 hours.  This is due to the gas (CO2) used during the surgery.  This pain will pass quickly as the gas is absorbed.  For relief, take  your pain medicine and lie flat.    DIET:  -  You may eat your normal diet unless told otherwise.    -  Foods that are high in protein (fish, meat, poultry, soy, dairy and beans) may help you heal faster.    -  Foods that are high in fiber (prunes, vegetables, fruits and grains) can help prevent constipation (trouble pooping).  Constipation is common after surgery, especially if you take opioid pain medicines.    CONSTIPATION:  If you become constipated (trouble pooping), try the options listed below as needed.  -  Take stool softeners as needed, such as Colace.    -  Milk of magnesia:  30 mL (2 tablespoons) twice a day.    -  Metamucil:  2 tablespoons mixed with 12 ounces of liquid.    If you're told to use a laxative, try the following options:  -  Senokot-S    -  Dulcolax oral or suppository    -  Miralax every day as needed    You can stop if you are pooping regularly or if you start having diarrhea (watery poop).  Call our office if you have not had a bowel movement for 2 days.    FOLLOW-UP VISITS:  You may need to see your surgeon for a check-up.  This varies by the type of surgery you had.  If you need to return, your surgeon will let you know when.  Please call your surgeon's office to make a follow-up appointment if not already done.    CALL YOUR DOCTOR IF YOU HAVE:  SEVERE CHILLS AND FEVER .4 DEGREES FAHRENHEIT OR HIGHER, TAKEN UNDER THE TONGUE.   BRIGHT RED BLOOD OR LARGE CLOTS COMING OUT OF THE VAGINA--ENOUGH TO SOAK ONE PAD IN AN HOUR.  INCREASED PAIN, WARMTH, SWELLING, REDNESS, BLEEDING OR FLUID LEAKING FROM THE SURGERY SITE.  URINE OR VAGINAL FLUID THAT SMELLS BAD.  YOU CANNOT URINATE (USE THE TOILET), IT BURNS WHEN YOU URINATE OR YOU NEED TO GO MORE OFTEN.  SEVERE NAUSEA (FEELING SICK TO YOUR STOMACH) OR VOMITING (THROWING UP).  INCREASED PAIN THAT YOU CANNOT CONTROL WITH MEDICINE.       GENERAL ANESTHESIA OR SEDATION ADULT DISCHARGE INSTRUCTIONS   SPECIAL PRECAUTIONS FOR 24 HOURS AFTER  SURGERY    IT IS NOT UNUSUAL TO FEEL LIGHT-HEADED OR FAINT, UP TO 24 HOURS AFTER SURGERY OR WHILE TAKING PAIN MEDICATION.  IF YOU HAVE THESE SYMPTOMS; SIT FOR A FEW MINUTES BEFORE STANDING AND HAVE SOMEONE ASSIST YOU WHEN YOU GET UP TO WALK OR USE THE BATHROOM.    YOU SHOULD REST AND RELAX FOR THE NEXT 24 HOURS AND YOU MUST MAKE ARRANGEMENTS TO HAVE SOMEONE STAY WITH YOU FOR AT LEAST 24 HOURS AFTER YOUR DISCHARGE.  AVOID HAZARDOUS AND STRENUOUS ACTIVITIES.  DO NOT MAKE IMPORTANT DECISIONS FOR 24 HOURS.    DO NOT DRIVE ANY VEHICLE OR OPERATE MECHANICAL EQUIPMENT FOR 24 HOURS FOLLOWING THE END OF YOUR SURGERY.  EVEN THOUGH YOU MAY FEEL NORMAL, YOUR REACTIONS MAY BE AFFECTED BY THE MEDICATION YOU HAVE RECEIVED.    DO NOT DRINK ALCOHOLIC BEVERAGES FOR 24 HOURS FOLLOWING YOUR SURGERY.    DRINK CLEAR LIQUIDS (APPLE JUICE, GINGER ALE, 7-UP, BROTH, ETC.).  PROGRESS TO YOUR REGULAR DIET AS YOU FEEL ABLE.    YOU MAY HAVE A DRY MOUTH, A SORE THROAT, MUSCLES ACHES OR TROUBLE SLEEPING.  THESE SHOULD GO AWAY AFTER 24 HOURS.    CALL YOUR DOCTOR FOR ANY OF THE FOLLOWING:  SIGNS OF INFECTION (FEVER, GROWING TENDERNESS AT THE SURGERY SITE, A LARGE AMOUNT OF DRAINAGE OR BLEEDING, SEVERE PAIN, FOUL-SMELLING DRAINAGE, REDNESS OR SWELLING.    IT HAS BEEN OVER 8 TO 10 HOURS SINCE SURGERY AND YOU ARE STILL NOT ABLE TO URINATE (PASS WATER).     DR. SANDRA MCDUFFIE M.D.  CLINIC PHONE NUMBER:  [phone]    [facility]      You had toradol at 2:30pm, ibuprofen ok after 8:30pm    Remove patch behind ear Friday at 12:45pm    You had tylenol 975mg at 11:30am, next dose after 5:30pm if needed

## 2023-08-17 NOTE — ANESTHESIA POSTPROCEDURE EVALUATION
Patient: Jie Salas    Procedure: Procedure(s):  Xi Robotic Assisted Total Laparoscopic Hysterectomy, Bilateral Salpingectomy, Cystoscopy  REMOVAL OF NEXPLANON       Anesthesia Type:  General    Note:     Postop Pain Control: Uneventful            Sign Out: Well controlled pain   PONV: No   Neuro/Psych: Uneventful            Sign Out: Acceptable/Baseline neuro status   Airway/Respiratory:             Sign Out: Acceptable/Baseline resp. status   CV/Hemodynamics:             Sign Out: Acceptable CV status   Other NRE:    DID A NON-ROUTINE EVENT OCCUR?            Last vitals:  Vitals Value Taken Time   /75 08/17/23 1530   Temp 99.3  F (37.4  C) 08/17/23 1448   Pulse 93 08/17/23 1531   Resp 11 08/17/23 1531   SpO2 93 % 08/17/23 1531   Vitals shown include unvalidated device data.    Electronically Signed By: Huan Garcia DO  August 17, 2023  3:32 PM

## 2023-08-21 LAB
PATH REPORT.COMMENTS IMP SPEC: NORMAL
PATH REPORT.COMMENTS IMP SPEC: NORMAL
PATH REPORT.FINAL DX SPEC: NORMAL
PATH REPORT.GROSS SPEC: NORMAL
PATH REPORT.MICROSCOPIC SPEC OTHER STN: NORMAL
PATH REPORT.MICROSCOPIC SPEC OTHER STN: NORMAL
PATH REPORT.RELEVANT HX SPEC: NORMAL
PHOTO IMAGE: NORMAL

## 2023-08-21 PROCEDURE — 88342 IMHCHEM/IMCYTCHM 1ST ANTB: CPT | Mod: 26 | Performed by: PATHOLOGY

## 2023-08-21 PROCEDURE — 88307 TISSUE EXAM BY PATHOLOGIST: CPT | Mod: 26 | Performed by: PATHOLOGY

## 2023-08-21 PROCEDURE — 88341 IMHCHEM/IMCYTCHM EA ADD ANTB: CPT | Mod: 26 | Performed by: PATHOLOGY

## 2024-07-06 ENCOUNTER — HEALTH MAINTENANCE LETTER (OUTPATIENT)
Age: 39
End: 2024-07-06

## 2025-07-13 ENCOUNTER — HEALTH MAINTENANCE LETTER (OUTPATIENT)
Age: 40
End: 2025-07-13

## (undated) DEVICE — DRSG ABDOMINAL 07 1/2X8" 7197D

## (undated) DEVICE — SU VICRYL 0 CT 36" J358H

## (undated) DEVICE — ESU GROUND PAD ADULT W/CORD E7507

## (undated) DEVICE — GLOVE PROTEXIS BLUE W/NEU-THERA 7.5  2D73EB75

## (undated) DEVICE — BLADE CLIPPER SGL USE 9680

## (undated) DEVICE — DAVINCI S CANNULA SEAL 8.5-13MM 420206

## (undated) DEVICE — RETR ELEV / UTERINE MANIPULATOR V-CARE MED CUP 60-6085-201

## (undated) DEVICE — SOL WATER IRRIG 1000ML BOTTLE 2F7114

## (undated) DEVICE — NDL INSUFFLATION 13GA 120MM C2201

## (undated) DEVICE — SU MONOCRYL 0 CT-1 36" UND Y946H

## (undated) DEVICE — PACK C-SECTION LF PL15OTA83B

## (undated) DEVICE — SU VICRYL 0 TIE 12X18" J906G

## (undated) DEVICE — SUCTION CANISTER MEDIVAC LINER 3000ML W/LID 65651-530

## (undated) DEVICE — SU VICRYL 4-0 PS-2 18" UND J496H

## (undated) DEVICE — CATH TRAY FOLEY SURESTEP 16FR DRAIN BAG STATOCK A899916

## (undated) DEVICE — DAVINCI SI DRAPE ACCESSORY KIT 3-ARM 420290

## (undated) DEVICE — GLOVE BIOGEL PI MICRO SZ 6.5 48565

## (undated) DEVICE — GLOVE PROTEXIS W/NEU-THERA 7.5  2D73TE75

## (undated) DEVICE — LINEN BABY BLANKET 5434

## (undated) DEVICE — LINEN TOWEL PACK X10 5473

## (undated) DEVICE — TUBING IRRIG CYSTO/BLADDER SET 81" LF 2C4040

## (undated) DEVICE — PREP POVIDONE IODINE SOLUTION 10% 4OZ BOTTLE 29906-004

## (undated) DEVICE — DRSG TELFA ISLAND 4X10"

## (undated) DEVICE — PREP CHLORAPREP 26ML TINTED ORANGE  260815

## (undated) DEVICE — TUBING CONMED AIRSEAL SMOKE EVAC INSUFFLATION ASM-EVAC

## (undated) DEVICE — DAVINCI HOT SHEARS TIP COVER  400180

## (undated) DEVICE — GLOVE BIOGEL PI MICRO SZ 7.0 48570

## (undated) DEVICE — ESU CORD MONOPOLAR 10'  E0510

## (undated) DEVICE — PREP POVIDONE-IODINE 7.5% SCRUB 4OZ BOTTLE MDS093945

## (undated) DEVICE — BAG CLEAR TRASH 1.3M 39X33" P4040C

## (undated) DEVICE — GLOVE PROTEXIS POWDER FREE SMT 7.5  2D72PT75X

## (undated) DEVICE — SU VICRYL 3-0 CT-1 36" J344H

## (undated) DEVICE — LINEN FULL SHEET 5511

## (undated) DEVICE — STOCKING SLEEVE VASOPRESS COMPRESSION CALF MED 18" VP501M

## (undated) DEVICE — PAD CHUX UNDERPAD 30X36" P3036C

## (undated) DEVICE — ANTIFOG SOLUTION W/FOAM PAD 31142527

## (undated) DEVICE — KIT PATIENT POSITIONING PIGAZZI LATEX FREE 40580

## (undated) DEVICE — CAP BABY PINK/BLUE IC-2

## (undated) DEVICE — NDL INSUFFLATION 13GA 150MM C2202

## (undated) DEVICE — TRANSFER DEVICE BLOOD NDL HOLDER 364880

## (undated) DEVICE — GLOVE BIOGEL PI ULTRATOUCH SZ 8.0 41180

## (undated) DEVICE — SOL NACL 0.9% INJ 1000ML BAG 2B1324X

## (undated) DEVICE — DECANTER VIAL 2006S

## (undated) DEVICE — SU VICRYL 3-0 CT-1 36" J944H

## (undated) DEVICE — LINEN POUCH DBL 5427

## (undated) DEVICE — PREP CHLORAPREP 26ML TINTED HI-LITE ORANGE 930815

## (undated) DEVICE — ESU HOLDER LAP INST DISP PURPLE LONG 330MM H-PRO-330

## (undated) DEVICE — GLOVE BIOGEL PI MICRO SZ 7.5 48575

## (undated) DEVICE — LINEN HALF SHEET 5512

## (undated) DEVICE — DRSG KERLIX 4 1/2"X4YDS ROLL 6730

## (undated) DEVICE — GLOVE PROTEXIS W/NEU-THERA 6.5  2D73TE65

## (undated) DEVICE — GLOVE BIOGEL PI MICRO INDICATOR UNDERGLOVE SZ 6.5 48965

## (undated) DEVICE — SU MONOCRYL 4-0 PS-2 27" UND Y426H

## (undated) DEVICE — SU WND CLOSURE VLOC 180 ABS 0 9" GS-21 VLOCL0346

## (undated) DEVICE — GLOVE PROTEXIS W/NEU-THERA 6.0  2D73TE60

## (undated) DEVICE — SU DERMABOND ADVANCED .7ML DNX12

## (undated) DEVICE — PACK DAVINCI GYN SMA15GDFS1

## (undated) DEVICE — SYSTEM LAPAROVUE VISIBILITY LAPVUE10

## (undated) DEVICE — SYR 10ML LL W/O NDL 302995

## (undated) DEVICE — DRSG STERI STRIP 1/2X4" R1547

## (undated) DEVICE — ENDO TROCAR CONMED AIRSEAL BLADELESS 05X120MM IAS5-120LP

## (undated) DEVICE — Device

## (undated) DEVICE — DAVINCI OBTURATOR 8MM BLADELESS 420023

## (undated) DEVICE — LINEN DRAPE 54X72" 5467

## (undated) DEVICE — GLOVE PROTEXIS BLUE W/NEU-THERA 8.0  2D73EB80

## (undated) DEVICE — SUCTION IRR STRYKERFLOW II W/TIP 250-070-520

## (undated) DEVICE — DRSG TEGADERM 4X10" 1627

## (undated) DEVICE — SOL NACL 0.9% IRRIG 1000ML BOTTLE 2F7124

## (undated) DEVICE — BLADE KNIFE SURG 10 371110

## (undated) DEVICE — SYR 03ML LL W/O NDL 309657

## (undated) RX ORDER — LIDOCAINE HYDROCHLORIDE AND EPINEPHRINE 10; 10 MG/ML; UG/ML
INJECTION, SOLUTION INFILTRATION; PERINEURAL
Status: DISPENSED
Start: 2023-08-17

## (undated) RX ORDER — ACETAMINOPHEN 325 MG/1
TABLET ORAL
Status: DISPENSED
Start: 2023-08-17

## (undated) RX ORDER — MORPHINE SULFATE 1 MG/ML
INJECTION, SOLUTION EPIDURAL; INTRATHECAL; INTRAVENOUS
Status: DISPENSED
Start: 2017-09-15

## (undated) RX ORDER — CEFAZOLIN SODIUM/WATER 2 G/20 ML
SYRINGE (ML) INTRAVENOUS
Status: DISPENSED
Start: 2023-08-17

## (undated) RX ORDER — OXYTOCIN/0.9 % SODIUM CHLORIDE 30/500 ML
PLASTIC BAG, INJECTION (ML) INTRAVENOUS
Status: DISPENSED
Start: 2017-09-15

## (undated) RX ORDER — FENTANYL CITRATE 50 UG/ML
INJECTION, SOLUTION INTRAMUSCULAR; INTRAVENOUS
Status: DISPENSED
Start: 2023-08-17

## (undated) RX ORDER — ONDANSETRON 2 MG/ML
INJECTION INTRAMUSCULAR; INTRAVENOUS
Status: DISPENSED
Start: 2023-08-17

## (undated) RX ORDER — BUPIVACAINE HYDROCHLORIDE 2.5 MG/ML
INJECTION, SOLUTION EPIDURAL; INFILTRATION; INTRACAUDAL
Status: DISPENSED
Start: 2023-08-17

## (undated) RX ORDER — OXYCODONE HYDROCHLORIDE 5 MG/1
TABLET ORAL
Status: DISPENSED
Start: 2023-08-17